# Patient Record
Sex: FEMALE | Race: WHITE | URBAN - METROPOLITAN AREA
[De-identification: names, ages, dates, MRNs, and addresses within clinical notes are randomized per-mention and may not be internally consistent; named-entity substitution may affect disease eponyms.]

---

## 2021-03-21 ENCOUNTER — IMMUNIZATION (OUTPATIENT)
Dept: PRIMARY CARE CLINIC | Age: 56
End: 2021-03-21
Payer: COMMERCIAL

## 2021-03-21 PROCEDURE — 91300 COVID-19, PFIZER VACCINE 30MCG/0.3ML DOSE: CPT | Performed by: PHYSICIAN ASSISTANT

## 2021-03-21 PROCEDURE — 0001A COVID-19, PFIZER VACCINE 30MCG/0.3ML DOSE: CPT | Performed by: PHYSICIAN ASSISTANT

## 2021-04-15 ENCOUNTER — IMMUNIZATION (OUTPATIENT)
Dept: PRIMARY CARE CLINIC | Age: 56
End: 2021-04-15
Payer: COMMERCIAL

## 2021-04-15 PROCEDURE — 91300 COVID-19, PFIZER VACCINE 30MCG/0.3ML DOSE: CPT | Performed by: INTERNAL MEDICINE

## 2021-04-15 PROCEDURE — 0002A COVID-19, PFIZER VACCINE 30MCG/0.3ML DOSE: CPT | Performed by: INTERNAL MEDICINE

## 2021-07-04 ENCOUNTER — NURSE TRIAGE (OUTPATIENT)
Dept: OTHER | Facility: CLINIC | Age: 56
End: 2021-07-04

## 2021-07-04 NOTE — TELEPHONE ENCOUNTER
Reason for Disposition   [1] SEVERE pain (e.g., excruciating, unable to do any normal activities) AND [2] not improved after 2 hours of pain medicine    Answer Assessment - Initial Assessment Questions  1. ONSET: \"When did the pain start?\"       1 day ago    2. LOCATION: \"Where is the pain located? \"       R leg pain    3. PAIN: \"How bad is the pain? \"    (Scale 1-10; or mild, moderate, severe)    -  MILD (1-3): doesn't interfere with normal activities     -  MODERATE (4-7): interferes with normal activities (e.g., work or school) or awakens from sleep, limping     -  SEVERE (8-10): excruciating pain, unable to do any normal activities, unable to walk      Severe    4. WORK OR EXERCISE: \"Has there been any recent work or exercise that involved this part of the body? \"       Denies    5. CAUSE: \"What do you think is causing the leg pain? \"      Unknown    6. OTHER SYMPTOMS: \"Do you have any other symptoms? \" (e.g., chest pain, back pain, breathing difficulty, swelling, rash, fever, numbness, weakness)      Denies    7. PREGNANCY: \"Is there any chance you are pregnant? \" \"When was your last menstrual period? \"      NA    Protocols used: LEG PAIN-ADULT-AH    Brief description of triage: R leg pain that began 1 day ago. Calf of right leg is swollen    Triage indicates for patient to be seen in 4 hours    Care advice provided, patient verbalizes understanding; denies any other questions or concerns; instructed to call back for any new or worsening symptoms. This triage is a result of a call to [facility]. Please do not respond to the triage nurse through this encounter. Any subsequent communication should be directly with the patient.

## 2023-03-06 DIAGNOSIS — R10.9 CHRONIC ABDOMINAL PAIN: Primary | ICD-10-CM

## 2023-03-06 DIAGNOSIS — G89.29 CHRONIC ABDOMINAL PAIN: Primary | ICD-10-CM

## 2023-03-07 ENCOUNTER — TELEPHONE (OUTPATIENT)
Dept: PRIMARY CARE | Facility: CLINIC | Age: 58
End: 2023-03-07
Payer: MEDICARE

## 2023-03-07 RX ORDER — POTASSIUM CHLORIDE 750 MG/1
1 TABLET, EXTENDED RELEASE ORAL DAILY
COMMUNITY
Start: 2020-01-07 | End: 2023-06-06 | Stop reason: SDUPTHER

## 2023-03-07 RX ORDER — OXYCODONE AND ACETAMINOPHEN 10; 325 MG/1; MG/1
1 TABLET ORAL EVERY 6 HOURS PRN
COMMUNITY
Start: 2021-04-16 | End: 2023-03-07 | Stop reason: SDUPTHER

## 2023-03-07 RX ORDER — ERGOCALCIFEROL 1.25 MG/1
1.25 CAPSULE ORAL 2 TIMES WEEKLY
COMMUNITY
Start: 2020-01-07 | End: 2023-06-06 | Stop reason: SDUPTHER

## 2023-03-07 RX ORDER — POLYETHYLENE GLYCOL 3350 17 G/17G
17 POWDER, FOR SOLUTION ORAL
COMMUNITY
Start: 2020-01-07

## 2023-03-07 RX ORDER — CYANOCOBALAMIN 1000 UG/ML
1000 INJECTION, SOLUTION INTRAMUSCULAR; SUBCUTANEOUS
COMMUNITY
Start: 2020-04-13 | End: 2023-09-08 | Stop reason: SDUPTHER

## 2023-03-07 RX ORDER — FUROSEMIDE 40 MG/1
1 TABLET ORAL 2 TIMES DAILY
COMMUNITY
Start: 2020-01-07 | End: 2023-06-06 | Stop reason: SDUPTHER

## 2023-03-07 RX ORDER — LANOLIN ALCOHOL/MO/W.PET/CERES
1 CREAM (GRAM) TOPICAL DAILY
COMMUNITY
Start: 2020-01-07

## 2023-03-07 RX ORDER — OXYCODONE AND ACETAMINOPHEN 10; 325 MG/1; MG/1
1 TABLET ORAL EVERY 6 HOURS PRN
Qty: 90 TABLET | Refills: 0 | Status: SHIPPED | OUTPATIENT
Start: 2023-03-07 | End: 2023-04-10 | Stop reason: SDUPTHER

## 2023-03-07 RX ORDER — NALOXONE HYDROCHLORIDE 4 MG/.1ML
4 SPRAY NASAL AS NEEDED
COMMUNITY
Start: 2020-01-07

## 2023-04-06 PROBLEM — R94.31 ABNORMAL EKG: Status: RESOLVED | Noted: 2023-04-06 | Resolved: 2023-04-06

## 2023-04-06 PROBLEM — F17.200 CURRENT SMOKER: Status: ACTIVE | Noted: 2023-04-06

## 2023-04-06 PROBLEM — M79.671 RIGHT FOOT PAIN: Status: RESOLVED | Noted: 2023-04-06 | Resolved: 2023-04-06

## 2023-04-06 PROBLEM — E04.1 THYROID NODULE: Status: ACTIVE | Noted: 2023-04-06

## 2023-04-06 PROBLEM — N95.9 POSTMENOPAUSAL SYMPTOMS: Status: ACTIVE | Noted: 2023-04-06

## 2023-04-06 PROBLEM — R07.89 ATYPICAL CHEST PAIN: Status: RESOLVED | Noted: 2023-04-06 | Resolved: 2023-04-06

## 2023-04-06 PROBLEM — I50.9 CONGESTIVE HEART FAILURE (MULTI): Status: ACTIVE | Noted: 2022-08-16

## 2023-04-06 PROBLEM — R10.9 CHRONIC ABDOMINAL PAIN: Status: ACTIVE | Noted: 2023-04-06

## 2023-04-06 PROBLEM — E53.8 VITAMIN B12 DEFICIENCY: Status: ACTIVE | Noted: 2023-04-06

## 2023-04-06 PROBLEM — G89.29 CHRONIC RIGHT SHOULDER PAIN: Status: ACTIVE | Noted: 2023-04-06

## 2023-04-06 PROBLEM — E55.9 VITAMIN D INSUFFICIENCY: Status: ACTIVE | Noted: 2023-04-06

## 2023-04-06 PROBLEM — E66.9 OBESITY, CLASS I, BMI 30-34.9: Status: ACTIVE | Noted: 2022-08-16

## 2023-04-06 PROBLEM — K90.829 SHORT GUT SYNDROME: Status: ACTIVE | Noted: 2023-04-06

## 2023-04-06 PROBLEM — M54.30 SCIATICA: Status: ACTIVE | Noted: 2023-04-06

## 2023-04-06 PROBLEM — M25.511 CHRONIC RIGHT SHOULDER PAIN: Status: ACTIVE | Noted: 2023-04-06

## 2023-04-06 PROBLEM — G89.29 CHRONIC ABDOMINAL PAIN: Status: ACTIVE | Noted: 2023-04-06

## 2023-04-06 PROBLEM — M79.604 PAIN OF RIGHT LOWER EXTREMITY: Status: RESOLVED | Noted: 2023-04-06 | Resolved: 2023-04-06

## 2023-04-06 PROBLEM — M25.551 RIGHT HIP PAIN: Status: ACTIVE | Noted: 2023-04-06

## 2023-04-06 PROBLEM — F11.20 CHRONIC NARCOTIC DEPENDENCE (MULTI): Status: ACTIVE | Noted: 2023-04-06

## 2023-04-06 PROBLEM — E78.2 HYPERLIPEMIA, MIXED: Status: ACTIVE | Noted: 2023-04-06

## 2023-04-06 PROBLEM — K46.9 ABDOMINAL HERNIA: Status: ACTIVE | Noted: 2023-04-06

## 2023-04-06 NOTE — ASSESSMENT & PLAN NOTE
Controlled med contract and drug screen done 1/23 - in old chart  I have personally reviewed the OARRS report.  This report is scanned into the electronic medical record.  I have considered the risks of abuse, dependence, addiction and diversion.  I believe that it is clinically appropriate to prescribe this medication.  Reevaluate in 3 months.

## 2023-04-06 NOTE — PROGRESS NOTES
Subjective   Patient ID: Angelia Jimenez is a 58 y.o. female who presents for follow up opiod visit.    HPI  Patient presents today follow up chronic pain meds. Pain is well controlled. No side effects from medication. Would like something for migraines.  Otherwise feels well. No other complaints or concerns.    The patient's relevant past medical, surgical, family and social history was reviewed in Epic.    Review of Systems  A complete review of systems was performed and all systems were normal except what is noted in the HPI.    Objective   Physical Exam  Constitutional:       General: She is not in acute distress.     Appearance: Normal appearance.   Neurological:      Mental Status: She is alert.   Psychiatric:         Mood and Affect: Mood normal.         Behavior: Behavior normal.         Thought Content: Thought content normal.         Judgment: Judgment normal.             Assessment/Plan   Problem List Items Addressed This Visit       Chronic abdominal pain - Primary     well controlled   Continue current medication = no side effects  Reevaluate in 3 months.           Chronic narcotic dependence (CMS/[facility])     Controlled med contract and drug screen done 1/23 - in old chart  I have personally reviewed the OARRS report.  This report is scanned into the electronic medical record.  I have considered the risks of abuse, dependence, addiction and diversion.  I believe that it is clinically appropriate to prescribe this medication.  Reevaluate in 3 months.               Patient understands and agrees with treatment plan.         Barbara Pulido MD

## 2023-04-06 NOTE — PATIENT INSTRUCTIONS
I would like you to follow up in 3 months  Please have all labs that were ordered done at least 1 week prior to your visit.

## 2023-04-10 ENCOUNTER — TELEMEDICINE (OUTPATIENT)
Dept: PRIMARY CARE | Facility: CLINIC | Age: 58
End: 2023-04-10
Payer: MEDICARE

## 2023-04-10 DIAGNOSIS — G89.29 CHRONIC ABDOMINAL PAIN: ICD-10-CM

## 2023-04-10 DIAGNOSIS — F11.20 CHRONIC NARCOTIC DEPENDENCE (MULTI): ICD-10-CM

## 2023-04-10 DIAGNOSIS — Z12.11 ENCOUNTER FOR SCREENING FOR MALIGNANT NEOPLASM OF COLON: ICD-10-CM

## 2023-04-10 DIAGNOSIS — G43.009 MIGRAINE WITHOUT AURA AND WITHOUT STATUS MIGRAINOSUS, NOT INTRACTABLE: ICD-10-CM

## 2023-04-10 DIAGNOSIS — R10.9 CHRONIC ABDOMINAL PAIN: ICD-10-CM

## 2023-04-10 DIAGNOSIS — R10.9 CHRONIC ABDOMINAL PAIN: Primary | ICD-10-CM

## 2023-04-10 DIAGNOSIS — G89.29 CHRONIC ABDOMINAL PAIN: Primary | ICD-10-CM

## 2023-04-10 PROCEDURE — 99213 OFFICE O/P EST LOW 20 MIN: CPT | Performed by: FAMILY MEDICINE

## 2023-04-10 RX ORDER — OXYCODONE AND ACETAMINOPHEN 10; 325 MG/1; MG/1
1 TABLET ORAL EVERY 6 HOURS PRN
Qty: 90 TABLET | Refills: 0 | Status: SHIPPED | OUTPATIENT
Start: 2023-04-10 | End: 2023-05-08 | Stop reason: SDUPTHER

## 2023-04-10 RX ORDER — SUMATRIPTAN SUCCINATE 100 MG/1
100 TABLET ORAL ONCE AS NEEDED
Qty: 27 TABLET | Refills: 3 | Status: SHIPPED | OUTPATIENT
Start: 2023-04-10 | End: 2023-04-26

## 2023-04-10 NOTE — ASSESSMENT & PLAN NOTE
Start imitrex as directed Risks, benefits and side effects reviewed with patient.  Reevaluate in 3 months.

## 2023-04-26 ENCOUNTER — TELEPHONE (OUTPATIENT)
Dept: PRIMARY CARE | Facility: CLINIC | Age: 58
End: 2023-04-26
Payer: MEDICARE

## 2023-04-26 DIAGNOSIS — G43.009 MIGRAINE WITHOUT AURA AND WITHOUT STATUS MIGRAINOSUS, NOT INTRACTABLE: Primary | ICD-10-CM

## 2023-04-26 NOTE — TELEPHONE ENCOUNTER
Patient calling to report that the Imitrex is not working for her . She ahs been taking it as soon as the migraine hits but it is not working. She has Nausea and vomiting. She was advised to put peppermint oil on her temples which did help , she is not longer vomiting, but the pain level remains as was. Is there anything else you can prescribe for her migraines,

## 2023-05-08 ENCOUNTER — TELEPHONE (OUTPATIENT)
Dept: PRIMARY CARE | Facility: CLINIC | Age: 58
End: 2023-05-08
Payer: MEDICARE

## 2023-05-08 DIAGNOSIS — G89.29 CHRONIC ABDOMINAL PAIN: ICD-10-CM

## 2023-05-08 DIAGNOSIS — R10.9 CHRONIC ABDOMINAL PAIN: ICD-10-CM

## 2023-05-08 RX ORDER — OXYCODONE AND ACETAMINOPHEN 10; 325 MG/1; MG/1
1 TABLET ORAL EVERY 6 HOURS PRN
Qty: 90 TABLET | Refills: 0 | Status: SHIPPED | OUTPATIENT
Start: 2023-05-10 | End: 2023-06-06 | Stop reason: SDUPTHER

## 2023-05-08 NOTE — TELEPHONE ENCOUNTER
Rx Refill Request Telephone Encounter    Name:  Angelia Jimenez  :  [id number]  Medication Name:  Oxycodone  Dose :  mg  Route : oral  Frequency : every 6 hours  90  1 tablet every 6 hours  Specific Pharmacy location:  [facility] [place]  Date of last appointment:  April 10  Date of next appointment:    Best number to reach patient:  [phone]

## 2023-06-04 ASSESSMENT — ENCOUNTER SYMPTOMS
SHORTNESS OF BREATH: 0
HYPERTENSION: 1
ORTHOPNEA: 0
NECK PAIN: 1
PALPITATIONS: 0
PND: 0
BLURRED VISION: 0
HEADACHES: 1
SWEATS: 0

## 2023-06-06 ENCOUNTER — OFFICE VISIT (OUTPATIENT)
Dept: PRIMARY CARE | Facility: CLINIC | Age: 58
End: 2023-06-06
Payer: MEDICARE

## 2023-06-06 VITALS
SYSTOLIC BLOOD PRESSURE: 131 MMHG | WEIGHT: 164.6 LBS | HEIGHT: 63 IN | TEMPERATURE: 98 F | DIASTOLIC BLOOD PRESSURE: 84 MMHG | HEART RATE: 84 BPM | BODY MASS INDEX: 29.16 KG/M2 | OXYGEN SATURATION: 97 %

## 2023-06-06 DIAGNOSIS — R09.89 OTHER SPECIFIED SYMPTOMS AND SIGNS INVOLVING THE CIRCULATORY AND RESPIRATORY SYSTEMS: ICD-10-CM

## 2023-06-06 DIAGNOSIS — I83.92 VARICOSE VEINS OF LEFT LOWER EXTREMITY, UNSPECIFIED WHETHER COMPLICATED: Primary | ICD-10-CM

## 2023-06-06 DIAGNOSIS — M79.89 LEFT LEG SWELLING: ICD-10-CM

## 2023-06-06 DIAGNOSIS — Z00.00 HEALTH MAINTENANCE EXAMINATION: ICD-10-CM

## 2023-06-06 DIAGNOSIS — R10.9 CHRONIC ABDOMINAL PAIN: ICD-10-CM

## 2023-06-06 DIAGNOSIS — M79.605 PAIN OF LEFT LOWER EXTREMITY: ICD-10-CM

## 2023-06-06 DIAGNOSIS — F17.200 CURRENT SMOKER: ICD-10-CM

## 2023-06-06 DIAGNOSIS — G89.29 CHRONIC ABDOMINAL PAIN: ICD-10-CM

## 2023-06-06 DIAGNOSIS — E55.9 VITAMIN D INSUFFICIENCY: ICD-10-CM

## 2023-06-06 PROCEDURE — 99214 OFFICE O/P EST MOD 30 MIN: CPT | Performed by: NURSE PRACTITIONER

## 2023-06-06 RX ORDER — OXYCODONE AND ACETAMINOPHEN 10; 325 MG/1; MG/1
1 TABLET ORAL EVERY 6 HOURS PRN
Qty: 90 TABLET | Refills: 0 | Status: SHIPPED | OUTPATIENT
Start: 2023-06-06 | End: 2023-07-05 | Stop reason: SDUPTHER

## 2023-06-06 RX ORDER — FUROSEMIDE 40 MG/1
40 TABLET ORAL 2 TIMES DAILY
Qty: 180 TABLET | Refills: 1 | Status: SHIPPED | OUTPATIENT
Start: 2023-06-06 | End: 2023-12-27 | Stop reason: SDUPTHER

## 2023-06-06 RX ORDER — POTASSIUM CHLORIDE 750 MG/1
10 TABLET, FILM COATED, EXTENDED RELEASE ORAL DAILY
Qty: 90 TABLET | Refills: 1 | Status: SHIPPED | OUTPATIENT
Start: 2023-06-06 | End: 2024-02-01 | Stop reason: SDUPTHER

## 2023-06-06 RX ORDER — ERGOCALCIFEROL 1.25 MG/1
1.25 CAPSULE ORAL
Qty: 12 CAPSULE | Refills: 0 | Status: SHIPPED | OUTPATIENT
Start: 2023-06-06 | End: 2023-09-08

## 2023-06-06 ASSESSMENT — ENCOUNTER SYMPTOMS
COUGH: 0
WHEEZING: 0
ORTHOPNEA: 0
ARTHRALGIAS: 1
PND: 0
HEADACHES: 0
CHILLS: 0
SWEATS: 0
FEVER: 0
PALPITATIONS: 0
BLURRED VISION: 0
NECK PAIN: 0
ACTIVITY CHANGE: 0
FATIGUE: 0
WEAKNESS: 0
HYPERTENSION: 1
DIZZINESS: 0
SHORTNESS OF BREATH: 0

## 2023-06-06 ASSESSMENT — PATIENT HEALTH QUESTIONNAIRE - PHQ9
SUM OF ALL RESPONSES TO PHQ9 QUESTIONS 1 AND 2: 0
1. LITTLE INTEREST OR PLEASURE IN DOING THINGS: NOT AT ALL
2. FEELING DOWN, DEPRESSED OR HOPELESS: NOT AT ALL

## 2023-06-06 NOTE — PROGRESS NOTES
"Subjective   Patient ID: Angelia Jimenez is a 58 y.o. female who presents for Follow-up (ER pain/ swelling in legs and feet).    Left lower extremity pain and swelling  Present for about 1 month   Recent ER visit: Doppler unremarkable   Patient reports she was told to follow-up with PCP and have vascular work-up.  Patient with continual left lower extremity pain and swelling on today.  No redness or warmth  No fever.  Rates pain less than 8 on a scale of 0-10  Does have varicose veins    Hypertension  The current episode started in the past 7 days. The problem is unchanged. The problem is resistant. Associated symptoms include malaise/fatigue and peripheral edema. Pertinent negatives include no anxiety, blurred vision, chest pain, headaches, neck pain, orthopnea, palpitations, PND, shortness of breath or sweats. Risk factors for coronary artery disease include family history, post-menopausal state, smoking/tobacco exposure and stress. There are no compliance problems.         Review of Systems   Constitutional:  Positive for malaise/fatigue. Negative for activity change, chills, fatigue and fever.   Eyes:  Negative for blurred vision.   Respiratory:  Negative for cough, shortness of breath and wheezing.    Cardiovascular:  Positive for leg swelling. Negative for chest pain, palpitations, orthopnea and PND.   Musculoskeletal:  Positive for arthralgias. Negative for neck pain.   Neurological:  Negative for dizziness, weakness and headaches.       Objective   /84   Pulse 84   Temp 36.7 °C (98 °F)   Ht 1.6 m (5' 3\")   Wt 74.7 kg (164 lb 9.6 oz)   SpO2 97%   BMI 29.16 kg/m²     Physical Exam  Vitals reviewed.   Constitutional:       Appearance: Normal appearance.   HENT:      Head: Normocephalic.   Eyes:      Conjunctiva/sclera: Conjunctivae normal.   Cardiovascular:      Rate and Rhythm: Normal rate and regular rhythm.      Pulses: Normal pulses.      Heart sounds: Normal heart sounds.   Pulmonary:      Effort: " Pulmonary effort is normal.      Breath sounds: Normal breath sounds. No wheezing.   Abdominal:      General: Bowel sounds are normal. There is no distension.   Skin:     General: Skin is warm and dry.   Neurological:      General: No focal deficit present.      Mental Status: She is alert and oriented to person, place, and time.   Psychiatric:         Mood and Affect: Mood normal.         Behavior: Behavior normal.         Assessment/Plan   Problem List Items Addressed This Visit          Nervous    Chronic abdominal pain    Relevant Medications    oxyCODONE-acetaminophen (Percocet)  mg tablet       Endocrine/Metabolic    Vitamin D insufficiency    Relevant Medications    ergocalciferol (Vitamin D-2) 1.25 MG (71395 UT) capsule       Other    Current smoker    Relevant Orders    Vascular US PVR with exercise     Other Visit Diagnoses       Varicose veins of left lower extremity, unspecified whether complicated    -  Primary    Relevant Orders    Referral to Vascular Medicine    Health maintenance examination        Relevant Medications    furosemide (Lasix) 40 mg tablet    potassium chloride CR 10 mEq ER tablet    Left leg swelling        Relevant Orders    Vascular US PVR with exercise    Referral to Vascular Medicine    Pain of left lower extremity        Relevant Orders    Vascular US PVR with exercise    Referral to Vascular Medicine    Other specified symptoms and signs involving the circulatory and respiratory systems        Relevant Orders    Vascular US PVR with exercise

## 2023-06-12 DIAGNOSIS — I87.2 VENOUS INSUFFICIENCY: Primary | ICD-10-CM

## 2023-06-12 DIAGNOSIS — M79.662 PAIN IN LEFT LOWER LEG: ICD-10-CM

## 2023-06-29 DIAGNOSIS — F17.210 CIGARETTE NICOTINE DEPENDENCE WITHOUT COMPLICATION: Primary | ICD-10-CM

## 2023-07-05 ENCOUNTER — TELEPHONE (OUTPATIENT)
Dept: PRIMARY CARE | Facility: CLINIC | Age: 58
End: 2023-07-05
Payer: MEDICARE

## 2023-07-05 DIAGNOSIS — R10.9 CHRONIC ABDOMINAL PAIN: Primary | ICD-10-CM

## 2023-07-05 DIAGNOSIS — G89.29 CHRONIC ABDOMINAL PAIN: Primary | ICD-10-CM

## 2023-07-05 RX ORDER — OXYCODONE AND ACETAMINOPHEN 10; 325 MG/1; MG/1
1 TABLET ORAL EVERY 6 HOURS PRN
Qty: 90 TABLET | Refills: 0 | Status: SHIPPED | OUTPATIENT
Start: 2023-07-05 | End: 2023-08-02 | Stop reason: SDUPTHER

## 2023-07-05 NOTE — TELEPHONE ENCOUNTER
Rx Refill Request Telephone Encounter    Name:  Angelia Jimenez  :  [id number]  Medication Name:        Oxycodone-Acetaminophen  MG Oral Tablet 1 tab taken every 6 hours                    Specific Pharmacy location:  [facility]    Date of last appointment:  2023  Date of next appointment:  2023  Best number to reach patient:  [phone]

## 2023-07-21 LAB — NONINV COLON CA DNA+OCC BLD SCRN STL QL: NORMAL

## 2023-08-02 ENCOUNTER — TELEPHONE (OUTPATIENT)
Dept: PRIMARY CARE | Facility: CLINIC | Age: 58
End: 2023-08-02
Payer: MEDICARE

## 2023-08-02 DIAGNOSIS — R10.9 CHRONIC ABDOMINAL PAIN: Primary | ICD-10-CM

## 2023-08-02 DIAGNOSIS — G89.29 CHRONIC ABDOMINAL PAIN: Primary | ICD-10-CM

## 2023-08-02 NOTE — TELEPHONE ENCOUNTER
Rx Refill Request Telephone Encounter    Name:  Angelia Jimenez  :  [id number]  Medication Name:      oxyCODONE-acetaminophen (Percocet)  mg tablet [[id number]]     1 tab taken every 6 hours as needed                  Specific Pharmacy location:   [facility]  Date of last appointment:  2023  Date of next appointment:  2023  Best number to reach patient:  [phone]

## 2023-08-03 RX ORDER — OXYCODONE AND ACETAMINOPHEN 10; 325 MG/1; MG/1
1 TABLET ORAL EVERY 6 HOURS PRN
Qty: 90 TABLET | Refills: 0 | Status: SHIPPED | OUTPATIENT
Start: 2023-08-03 | End: 2023-08-31 | Stop reason: SDUPTHER

## 2023-08-27 DIAGNOSIS — Z12.11 ENCOUNTER FOR SCREENING FOR MALIGNANT NEOPLASM OF COLON: Primary | ICD-10-CM

## 2023-08-27 DIAGNOSIS — K90.829 SHORT GUT SYNDROME: ICD-10-CM

## 2023-08-29 PROBLEM — F17.210 NICOTINE DEPENDENCE, CIGARETTES, UNCOMPLICATED: Status: ACTIVE | Noted: 2023-08-29

## 2023-08-29 PROBLEM — G44.209 TENSION HEADACHE: Status: ACTIVE | Noted: 2023-08-29

## 2023-08-29 RX ORDER — SUMATRIPTAN SUCCINATE 100 MG/1
TABLET ORAL
COMMUNITY
Start: 2023-04-10

## 2023-08-31 ENCOUNTER — TELEPHONE (OUTPATIENT)
Dept: PRIMARY CARE | Facility: CLINIC | Age: 58
End: 2023-08-31
Payer: MEDICARE

## 2023-08-31 DIAGNOSIS — R10.9 CHRONIC ABDOMINAL PAIN: ICD-10-CM

## 2023-08-31 DIAGNOSIS — G89.29 CHRONIC ABDOMINAL PAIN: ICD-10-CM

## 2023-08-31 RX ORDER — OXYCODONE AND ACETAMINOPHEN 10; 325 MG/1; MG/1
1 TABLET ORAL EVERY 6 HOURS PRN
Qty: 90 TABLET | Refills: 0 | Status: SHIPPED | OUTPATIENT
Start: 2023-08-31 | End: 2023-09-27 | Stop reason: SDUPTHER

## 2023-08-31 NOTE — TELEPHONE ENCOUNTER
Rx Refill Request Telephone Encounter    Name:  Angelia Jimenez  :  [id number]  Medication Name:      oxyCODONE-acetaminophen (Percocet)  mg tablet           Specific Pharmacy location:   [id number] [address]         Date of last appointment:  23  Date of next appointment: 23   Best number to reach patient: [phone]

## 2023-09-06 LAB
ALANINE AMINOTRANSFERASE (SGPT) (U/L) IN SER/PLAS: 15 U/L (ref 7–45)
ALBUMIN (G/DL) IN SER/PLAS: 4.6 G/DL (ref 3.4–5)
ALKALINE PHOSPHATASE (U/L) IN SER/PLAS: 65 U/L (ref 33–136)
ANION GAP IN SER/PLAS: 17 MMOL/L (ref 10–20)
ASPARTATE AMINOTRANSFERASE (SGOT) (U/L) IN SER/PLAS: 18 U/L (ref 9–39)
BILIRUBIN TOTAL (MG/DL) IN SER/PLAS: 0.5 MG/DL (ref 0–1.2)
CALCIDIOL (25 OH VITAMIN D3) (NG/ML) IN SER/PLAS: 54 NG/ML
CALCIUM (MG/DL) IN SER/PLAS: 9.9 MG/DL (ref 8.6–10.6)
CARBON DIOXIDE, TOTAL (MMOL/L) IN SER/PLAS: 23 MMOL/L (ref 21–32)
CHLORIDE (MMOL/L) IN SER/PLAS: 106 MMOL/L (ref 98–107)
CHOLESTEROL (MG/DL) IN SER/PLAS: 194 MG/DL (ref 0–199)
CHOLESTEROL IN HDL (MG/DL) IN SER/PLAS: 71.7 MG/DL
CHOLESTEROL IN LDL (MG/DL) IN SER/PLAS BY DIRECT ASSAY: 90 MG/DL (ref 0–129)
CHOLESTEROL/HDL RATIO: 2.7
COBALAMIN (VITAMIN B12) (PG/ML) IN SER/PLAS: 129 PG/ML (ref 211–911)
CREATININE (MG/DL) IN SER/PLAS: 0.88 MG/DL (ref 0.5–1.05)
ERYTHROCYTE DISTRIBUTION WIDTH (RATIO) BY AUTOMATED COUNT: 13.7 % (ref 11.5–14.5)
ERYTHROCYTE MEAN CORPUSCULAR HEMOGLOBIN CONCENTRATION (G/DL) BY AUTOMATED: 33 G/DL (ref 32–36)
ERYTHROCYTE MEAN CORPUSCULAR VOLUME (FL) BY AUTOMATED COUNT: 94 FL (ref 80–100)
ERYTHROCYTES (10*6/UL) IN BLOOD BY AUTOMATED COUNT: 4.61 X10E12/L (ref 4–5.2)
FERRITIN (UG/LL) IN SER/PLAS: 302 UG/L (ref 8–150)
FOLATE (NG/ML) IN SER/PLAS: 14.8 NG/ML
GFR FEMALE: 75 ML/MIN/1.73M2
GLUCOSE (MG/DL) IN SER/PLAS: 84 MG/DL (ref 74–99)
HEMATOCRIT (%) IN BLOOD BY AUTOMATED COUNT: 43.3 % (ref 36–46)
HEMOGLOBIN (G/DL) IN BLOOD: 14.3 G/DL (ref 12–16)
LDL: 82 MG/DL (ref 0–99)
LEUKOCYTES (10*3/UL) IN BLOOD BY AUTOMATED COUNT: 6.4 X10E9/L (ref 4.4–11.3)
MAGNESIUM (MG/DL) IN SER/PLAS: 2.39 MG/DL (ref 1.6–2.4)
NON HDL CHOLESTEROL: 122 MG/DL
NRBC (PER 100 WBCS) BY AUTOMATED COUNT: 0 /100 WBC (ref 0–0)
PLATELETS (10*3/UL) IN BLOOD AUTOMATED COUNT: 204 X10E9/L (ref 150–450)
POTASSIUM (MMOL/L) IN SER/PLAS: 5 MMOL/L (ref 3.5–5.3)
PROTEIN TOTAL: 6.8 G/DL (ref 6.4–8.2)
SODIUM (MMOL/L) IN SER/PLAS: 141 MMOL/L (ref 136–145)
THYROTROPIN (MIU/L) IN SER/PLAS BY DETECTION LIMIT <= 0.05 MIU/L: 1.5 MIU/L (ref 0.44–3.98)
TRIGLYCERIDE (MG/DL) IN SER/PLAS: 201 MG/DL (ref 0–149)
UREA NITROGEN (MG/DL) IN SER/PLAS: 16 MG/DL (ref 6–23)
VLDL: 40 MG/DL (ref 0–40)

## 2023-09-07 PROBLEM — F11.90 CHRONIC NARCOTIC USE: Status: ACTIVE | Noted: 2023-04-06

## 2023-09-07 NOTE — ASSESSMENT & PLAN NOTE
Low  B12 injection today and will restart monthly injections at home  Also continue OTC b12 as directed   Recheck 6 months

## 2023-09-07 NOTE — ASSESSMENT & PLAN NOTE
well controlled   Continue current medication = no side effects  CMC and urine drug done 1/23  Reevaluate in 3 months.

## 2023-09-07 NOTE — ASSESSMENT & PLAN NOTE
Encourage smoking cessation - not ready to quit - down to 2-3/day  Has CT lung order - will schedule

## 2023-09-07 NOTE — PATIENT INSTRUCTIONS
Please follow up in 3 months to review your chronic pain med use.     would like you to follow up in 6 months  Please have all labs that were ordered done at least 1 week prior to your visit.     Recommend healthy diet based around fruits, vegetables, and lean proteins such as chicken, turkey, fish, and beans.  Also include moderate portions of healthy carbohydrates such as wheat bread and pasta, sweet potatoes. Limit sweets and alcoholic beverages. Try not drink more than 100 calories in beverages daily.   It is important to get a protein-rich breakfast daily such as oatmeal, eggs or Greek yogurt.  Increase activity as able to a recommended goal of at least 30 minutes of cardiovascular exercise (walking, swimming, biking, jogging etc.) at least 5 days weekly and a goal of 45 minutes or more most days of the week for weight loss. This exercise can be done all at one time or broken up into 2 or more sessions throughout the day.

## 2023-09-08 ENCOUNTER — OFFICE VISIT (OUTPATIENT)
Dept: PRIMARY CARE | Facility: CLINIC | Age: 58
End: 2023-09-08
Payer: MEDICARE

## 2023-09-08 VITALS
HEART RATE: 90 BPM | TEMPERATURE: 97.1 F | OXYGEN SATURATION: 97 % | DIASTOLIC BLOOD PRESSURE: 85 MMHG | HEIGHT: 63 IN | SYSTOLIC BLOOD PRESSURE: 130 MMHG | BODY MASS INDEX: 30.37 KG/M2 | WEIGHT: 171.4 LBS

## 2023-09-08 DIAGNOSIS — E55.9 VITAMIN D INSUFFICIENCY: ICD-10-CM

## 2023-09-08 DIAGNOSIS — E66.9 OBESITY, CLASS I, BMI 30-34.9: ICD-10-CM

## 2023-09-08 DIAGNOSIS — Z11.59 ENCOUNTER FOR HEPATITIS C SCREENING TEST FOR LOW RISK PATIENT: ICD-10-CM

## 2023-09-08 DIAGNOSIS — E53.8 VITAMIN B12 DEFICIENCY: Primary | ICD-10-CM

## 2023-09-08 DIAGNOSIS — F17.200 CURRENT SMOKER: ICD-10-CM

## 2023-09-08 DIAGNOSIS — E78.2 HYPERLIPEMIA, MIXED: ICD-10-CM

## 2023-09-08 DIAGNOSIS — M48.062 PSEUDOCLAUDICATION: ICD-10-CM

## 2023-09-08 DIAGNOSIS — G89.29 CHRONIC ABDOMINAL PAIN: ICD-10-CM

## 2023-09-08 DIAGNOSIS — R10.9 CHRONIC ABDOMINAL PAIN: ICD-10-CM

## 2023-09-08 PROCEDURE — 90677 PCV20 VACCINE IM: CPT | Performed by: FAMILY MEDICINE

## 2023-09-08 PROCEDURE — 96372 THER/PROPH/DIAG INJ SC/IM: CPT | Performed by: FAMILY MEDICINE

## 2023-09-08 PROCEDURE — 99214 OFFICE O/P EST MOD 30 MIN: CPT | Performed by: FAMILY MEDICINE

## 2023-09-08 PROCEDURE — G0009 ADMIN PNEUMOCOCCAL VACCINE: HCPCS | Performed by: FAMILY MEDICINE

## 2023-09-08 RX ORDER — SYRINGE W-NEEDLE,DISPOSAB,3 ML 25GX5/8"
1 SYRINGE, EMPTY DISPOSABLE MISCELLANEOUS
Qty: 100 EACH | Refills: 0 | Status: SHIPPED | OUTPATIENT
Start: 2023-09-08

## 2023-09-08 RX ORDER — CYANOCOBALAMIN 1000 UG/ML
1000 INJECTION, SOLUTION INTRAMUSCULAR; SUBCUTANEOUS ONCE
Status: COMPLETED | OUTPATIENT
Start: 2023-09-08 | End: 2023-09-08

## 2023-09-08 RX ORDER — CYANOCOBALAMIN 1000 UG/ML
1000 INJECTION, SOLUTION INTRAMUSCULAR; SUBCUTANEOUS
Qty: 1 ML | Refills: 11 | Status: SHIPPED | OUTPATIENT
Start: 2023-09-08 | End: 2024-05-29 | Stop reason: SDUPTHER

## 2023-09-08 RX ORDER — SYRINGE W-NEEDLE,DISPOSAB,3 ML 25GX5/8"
1 SYRINGE, EMPTY DISPOSABLE MISCELLANEOUS
COMMUNITY
End: 2023-09-08 | Stop reason: SDUPTHER

## 2023-09-08 RX ADMIN — CYANOCOBALAMIN 1000 MCG: 1000 INJECTION, SOLUTION INTRAMUSCULAR; SUBCUTANEOUS at 10:09

## 2023-09-08 ASSESSMENT — PATIENT HEALTH QUESTIONNAIRE - PHQ9
1. LITTLE INTEREST OR PLEASURE IN DOING THINGS: NOT AT ALL
SUM OF ALL RESPONSES TO PHQ9 QUESTIONS 1 AND 2: 0
2. FEELING DOWN, DEPRESSED OR HOPELESS: NOT AT ALL

## 2023-09-27 ENCOUNTER — TELEPHONE (OUTPATIENT)
Dept: PRIMARY CARE | Facility: CLINIC | Age: 58
End: 2023-09-27
Payer: MEDICARE

## 2023-09-27 DIAGNOSIS — G89.29 CHRONIC ABDOMINAL PAIN: ICD-10-CM

## 2023-09-27 DIAGNOSIS — R10.9 CHRONIC ABDOMINAL PAIN: ICD-10-CM

## 2023-09-27 NOTE — TELEPHONE ENCOUNTER
Rx Refill Request Telephone Encounter    Name:  Angelia Jimenez  :  [id number]  Medication Name:        oxyCODONE-acetaminophen (Percocet)  mg tablet [[id number]]  1 tab by mouth every 6 hours if needed                    Specific Pharmacy location:  [facility]  Date of last appointment:  2023  Date of next appointment:  2023  Best number to reach patient:  [phone]

## 2023-09-28 RX ORDER — OXYCODONE AND ACETAMINOPHEN 10; 325 MG/1; MG/1
1 TABLET ORAL EVERY 6 HOURS PRN
Qty: 90 TABLET | Refills: 0 | Status: SHIPPED | OUTPATIENT
Start: 2023-09-28 | End: 2023-10-30 | Stop reason: SDUPTHER

## 2023-10-12 ENCOUNTER — APPOINTMENT (OUTPATIENT)
Dept: GASTROENTEROLOGY | Facility: CLINIC | Age: 58
End: 2023-10-12
Payer: MEDICARE

## 2023-10-30 ENCOUNTER — TELEPHONE (OUTPATIENT)
Dept: PRIMARY CARE | Facility: CLINIC | Age: 58
End: 2023-10-30
Payer: MEDICARE

## 2023-10-30 DIAGNOSIS — G89.29 CHRONIC ABDOMINAL PAIN: ICD-10-CM

## 2023-10-30 DIAGNOSIS — R10.9 CHRONIC ABDOMINAL PAIN: ICD-10-CM

## 2023-10-30 RX ORDER — OXYCODONE AND ACETAMINOPHEN 10; 325 MG/1; MG/1
1 TABLET ORAL EVERY 6 HOURS PRN
Qty: 90 TABLET | Refills: 0 | Status: SHIPPED | OUTPATIENT
Start: 2023-10-30 | End: 2023-11-27 | Stop reason: SDUPTHER

## 2023-10-30 NOTE — TELEPHONE ENCOUNTER
Rx Refill Request Telephone Encounter    Name:  Angelia Jimenez  :  [id number]  Medication Name:      oxyCODONE-acetaminophen (Percocet)  mg tablet [[id number] 1 tab taken every 6 hours if needed for pain                  Specific Pharmacy location:  [facility]  Date of last appointment:  2023  Date of next appointment:  2023  Best number to reach patient:  [phone]

## 2023-11-16 ENCOUNTER — APPOINTMENT (OUTPATIENT)
Dept: GASTROENTEROLOGY | Facility: CLINIC | Age: 58
End: 2023-11-16
Payer: MEDICARE

## 2023-11-20 ENCOUNTER — APPOINTMENT (OUTPATIENT)
Dept: GASTROENTEROLOGY | Facility: CLINIC | Age: 58
End: 2023-11-20
Payer: MEDICARE

## 2023-11-27 ENCOUNTER — TELEPHONE (OUTPATIENT)
Dept: PRIMARY CARE | Facility: CLINIC | Age: 58
End: 2023-11-27
Payer: MEDICARE

## 2023-11-27 DIAGNOSIS — R10.9 CHRONIC ABDOMINAL PAIN: ICD-10-CM

## 2023-11-27 DIAGNOSIS — G89.29 CHRONIC ABDOMINAL PAIN: ICD-10-CM

## 2023-11-27 NOTE — TELEPHONE ENCOUNTER
Rx Refill Request Telephone Encounter    Name:  Angelia Jimenez  :  [id number]  Medication Name:  Percocet   mg  Route : oral  Take 1 tablet by mouth every 6 hours if needed for severe pain (7 - 10).       [facility]  Date of last appointment:    Date of next appointment:  Dec 11  Best number to reach patient:  [phone]      Patient aware physician is out of office

## 2023-11-28 RX ORDER — OXYCODONE AND ACETAMINOPHEN 10; 325 MG/1; MG/1
1 TABLET ORAL EVERY 6 HOURS PRN
Qty: 90 TABLET | Refills: 0 | Status: SHIPPED | OUTPATIENT
Start: 2023-11-28 | End: 2023-12-11 | Stop reason: SDUPTHER

## 2023-12-08 ENCOUNTER — DOCUMENTATION (OUTPATIENT)
Dept: PRIMARY CARE | Facility: CLINIC | Age: 58
End: 2023-12-08
Payer: MEDICARE

## 2023-12-11 ENCOUNTER — TELEMEDICINE (OUTPATIENT)
Dept: PRIMARY CARE | Facility: CLINIC | Age: 58
End: 2023-12-11
Payer: MEDICARE

## 2023-12-11 ENCOUNTER — DOCUMENTATION (OUTPATIENT)
Dept: PRIMARY CARE | Facility: CLINIC | Age: 58
End: 2023-12-11

## 2023-12-11 ENCOUNTER — PATIENT OUTREACH (OUTPATIENT)
Dept: CARE COORDINATION | Facility: CLINIC | Age: 58
End: 2023-12-11

## 2023-12-11 DIAGNOSIS — G89.29 CHRONIC ABDOMINAL PAIN: ICD-10-CM

## 2023-12-11 DIAGNOSIS — R10.9 CHRONIC ABDOMINAL PAIN: ICD-10-CM

## 2023-12-11 DIAGNOSIS — S42.201A CLOSED FRACTURE OF PROXIMAL END OF RIGHT HUMERUS, UNSPECIFIED FRACTURE MORPHOLOGY, INITIAL ENCOUNTER: Primary | ICD-10-CM

## 2023-12-11 PROCEDURE — 99495 TRANSJ CARE MGMT MOD F2F 14D: CPT | Performed by: FAMILY MEDICINE

## 2023-12-11 RX ORDER — OXYCODONE AND ACETAMINOPHEN 10; 325 MG/1; MG/1
1 TABLET ORAL EVERY 6 HOURS PRN
Qty: 90 TABLET | Refills: 0 | Status: SHIPPED | OUTPATIENT
Start: 2023-12-11 | End: 2024-01-10

## 2023-12-11 NOTE — PROGRESS NOTES
Subjective   Patient ID: Angelia Jimenez is a 58 y.o. female who presents for Hospital Follow-up.      HPI  Patient presents today for hospital follow up. Admitted [facility] last week after being pulled over by her dog resulting in dislocation and fracture of her right shoulder. Had surgical treatment. Pain controlled but is taking 4 percocet daily as opposed to 2-3. No side effects pain meds. Doing ok at home. Wating to hear from ortho for follow up appointment.    Otherwise feels well. No other complaints or concerns.    The patient's relevant past medical, surgical, family and social history was reviewed in Epic.    Review of Systems  A complete review of systems was performed and all systems were normal except what is noted in the HPI.    Objective   Physical Exam  Constitutional:       Appearance: Normal appearance.   Neurological:      Mental Status: She is alert.             Assessment/Plan   Problem List Items Addressed This Visit       Chronic abdominal pain    Relevant Medications    oxyCODONE-acetaminophen (Percocet)  mg tablet     Other Visit Diagnoses       Closed fracture of proximal end of right humerus, unspecified fracture morphology, initial encounter    -  Primary    Relevant Medications    oxyCODONE-acetaminophen (Percocet)  mg tablet             Patient understands and agrees with treatment plan.         Barbara Pulido MD

## 2023-12-11 NOTE — PROGRESS NOTES
Discharge Facility:[facility]  Discharge Diagnosis:Fall S/P open reduction of right proximal humerus   Admission Date:12/6/233  Discharge Date: 12/08/23    PCP Appointment Date:12/11/23  Specialist Appointment Date:   Hospital Encounter and Summary: Linked  Patient has a 2 day follow up appointment   I did reach out to patient no call back as of this note.

## 2023-12-11 NOTE — ASSESSMENT & PLAN NOTE
Hospital course reviewed with patient  Ok for 4 percocet daily for now - wean as able - Risks, benefits and side effects reviewed with patient.   Ok early refill pain meds x 1  I have personally reviewed the OARRS report.  This report is scanned into the electronic medical record.  I have considered the risks of abuse, dependence, addiction and diversion.  I believe that it is clinically appropriate to prescribe this medication.  Patient will schedule follow up ortho ASAP

## 2023-12-21 ENCOUNTER — PATIENT OUTREACH (OUTPATIENT)
Dept: CARE COORDINATION | Facility: CLINIC | Age: 58
End: 2023-12-21
Payer: MEDICARE

## 2023-12-21 NOTE — PROGRESS NOTES
Unable to reach patient for call back after patient's follow up appointment with PCP.   WILLAM with call back number for patient to call if needed   If no voicemail available call attempts x 2 were made to contact the patient to assist with any questions or concerns patient may have.

## 2023-12-27 ENCOUNTER — TELEPHONE (OUTPATIENT)
Dept: PRIMARY CARE | Facility: CLINIC | Age: 58
End: 2023-12-27
Payer: MEDICARE

## 2023-12-27 DIAGNOSIS — Z00.00 HEALTH MAINTENANCE EXAMINATION: ICD-10-CM

## 2023-12-27 RX ORDER — FUROSEMIDE 40 MG/1
40 TABLET ORAL 2 TIMES DAILY
Qty: 180 TABLET | Refills: 1 | Status: SHIPPED | OUTPATIENT
Start: 2023-12-27 | End: 2024-03-08 | Stop reason: SDUPTHER

## 2023-12-27 NOTE — TELEPHONE ENCOUNTER
Rx Refill Request Telephone Encounter    Name:  Angelia Jimenez  :  [id number]  Medication Name:      furosemide (Lasix) 40 mg tablet    Route: Take 1 tablet (40 mg) by mouth 2 times a day.       Specific Pharmacy location:  [id number] [address] [facility]   Date of last appointment:  23  Date of next appointment:  3/8/24  Best number to reach patient: [phone]

## 2024-01-08 ENCOUNTER — PATIENT OUTREACH (OUTPATIENT)
Dept: CARE COORDINATION | Facility: CLINIC | Age: 59
End: 2024-01-08
Payer: MEDICARE

## 2024-01-26 ENCOUNTER — TELEPHONE (OUTPATIENT)
Dept: PRIMARY CARE | Facility: CLINIC | Age: 59
End: 2024-01-26
Payer: MEDICARE

## 2024-01-26 DIAGNOSIS — G89.29 CHRONIC RIGHT SHOULDER PAIN: ICD-10-CM

## 2024-01-26 DIAGNOSIS — M25.511 CHRONIC RIGHT SHOULDER PAIN: ICD-10-CM

## 2024-01-26 RX ORDER — OXYCODONE AND ACETAMINOPHEN 10; 325 MG/1; MG/1
1 TABLET ORAL EVERY 6 HOURS PRN
Qty: 90 TABLET | Refills: 0 | Status: SHIPPED | OUTPATIENT
Start: 2024-01-26 | End: 2024-02-26 | Stop reason: SDUPTHER

## 2024-01-26 NOTE — TELEPHONE ENCOUNTER
Rx Refill Request Telephone Encounter    Name:  Angelia Jimenez  :  [id number]  Medication Name:      oxyCODONE-acetaminophen (Percocet)  mg tablet [[id number]  1 tab taken by mouth every 6 hours if needed for severe pain                    Specific Pharmacy location:  [facility] [place]  Date of last appointment:  2023  Date of next appointment:  2024  Best number to reach patient:  [phone]

## 2024-02-01 ENCOUNTER — TELEPHONE (OUTPATIENT)
Dept: PRIMARY CARE | Facility: CLINIC | Age: 59
End: 2024-02-01
Payer: MEDICARE

## 2024-02-01 DIAGNOSIS — Z00.00 HEALTH MAINTENANCE EXAMINATION: ICD-10-CM

## 2024-02-01 DIAGNOSIS — E55.9 VITAMIN D INSUFFICIENCY: ICD-10-CM

## 2024-02-01 RX ORDER — POTASSIUM CHLORIDE 750 MG/1
10 TABLET, FILM COATED, EXTENDED RELEASE ORAL DAILY
Qty: 90 TABLET | Refills: 3 | Status: SHIPPED | OUTPATIENT
Start: 2024-02-01

## 2024-02-01 RX ORDER — ERGOCALCIFEROL 1.25 MG/1
1.25 CAPSULE ORAL 2 TIMES WEEKLY
Qty: 26 CAPSULE | Refills: 3 | Status: SHIPPED | OUTPATIENT
Start: 2024-02-01 | End: 2024-03-08 | Stop reason: DRUGHIGH

## 2024-02-01 NOTE — TELEPHONE ENCOUNTER
Rx Refill Request Telephone Encounter    Name:  Angelia Jimenez  :  [id number]  Medication Name:  potassium chloride CR 10 mEq ER tablet     oral  daily  90  Specific Pharmacy location:  [facility]  Date of last appointment:    Date of next appointment:  3/8  Best number to reach patient:  [phone]          Rx Refill Request Telephone Encounter    Name:  Angelia Jimenez  :  [id number]  Medication Name:  Vitamin D-25-Hydroxy    oral  twice a week  30    Specific Pharmacy location:    Date of last appointment:    Date of next appointment:    Best number to reach patient:

## 2024-02-26 ENCOUNTER — TELEPHONE (OUTPATIENT)
Dept: PRIMARY CARE | Facility: CLINIC | Age: 59
End: 2024-02-26
Payer: MEDICARE

## 2024-02-26 DIAGNOSIS — G89.29 CHRONIC RIGHT SHOULDER PAIN: ICD-10-CM

## 2024-02-26 DIAGNOSIS — M25.511 CHRONIC RIGHT SHOULDER PAIN: ICD-10-CM

## 2024-02-26 RX ORDER — OXYCODONE AND ACETAMINOPHEN 10; 325 MG/1; MG/1
1 TABLET ORAL EVERY 6 HOURS PRN
Qty: 90 TABLET | Refills: 0 | Status: SHIPPED | OUTPATIENT
Start: 2024-02-26 | End: 2024-03-22 | Stop reason: SDUPTHER

## 2024-02-26 RX ORDER — OXYCODONE AND ACETAMINOPHEN 10; 325 MG/1; MG/1
1 TABLET ORAL EVERY 6 HOURS PRN
Qty: 90 TABLET | Refills: 0 | Status: SHIPPED | OUTPATIENT
Start: 2024-02-26 | End: 2024-02-26 | Stop reason: SDUPTHER

## 2024-02-26 NOTE — TELEPHONE ENCOUNTER
The prescription was unable to be sent eletronically and it printed. Would the patient like to come  the prescription?

## 2024-02-26 NOTE — TELEPHONE ENCOUNTER
Rx Refill Request Telephone Encounter    Name:  Angelia Jimenez  :  [id number]  Medication Name:        oxyCODONE-acetaminophen (Percocet)  mg tablet   Route: Take 1 tablet by mouth every 6 hours if needed for severe pain (7 - 10).              Specific Pharmacy location:  [id number] [address] [facility]   Date of last appointment:  23  Date of next appointment:  3/8/24  Best number to reach patient:     [phone]

## 2024-03-02 ENCOUNTER — LAB (OUTPATIENT)
Dept: LAB | Facility: LAB | Age: 59
End: 2024-03-02
Payer: MEDICARE

## 2024-03-02 DIAGNOSIS — E53.8 VITAMIN B12 DEFICIENCY: ICD-10-CM

## 2024-03-02 DIAGNOSIS — E78.2 HYPERLIPEMIA, MIXED: ICD-10-CM

## 2024-03-02 DIAGNOSIS — Z11.59 ENCOUNTER FOR HEPATITIS C SCREENING TEST FOR LOW RISK PATIENT: ICD-10-CM

## 2024-03-02 LAB
ALBUMIN SERPL BCP-MCNC: 4.5 G/DL (ref 3.4–5)
ALP SERPL-CCNC: 63 U/L (ref 33–136)
ALT SERPL W P-5'-P-CCNC: 13 U/L (ref 7–45)
ANION GAP SERPL CALC-SCNC: 12 MMOL/L (ref 10–20)
AST SERPL W P-5'-P-CCNC: 17 U/L (ref 9–39)
BILIRUB SERPL-MCNC: 0.4 MG/DL (ref 0–1.2)
BUN SERPL-MCNC: 11 MG/DL (ref 6–23)
CALCIUM SERPL-MCNC: 9.7 MG/DL (ref 8.6–10.3)
CHLORIDE SERPL-SCNC: 106 MMOL/L (ref 98–107)
CHOLEST SERPL-MCNC: 168 MG/DL (ref 0–199)
CHOLESTEROL/HDL RATIO: 2.1
CO2 SERPL-SCNC: 26 MMOL/L (ref 21–32)
CREAT SERPL-MCNC: 0.91 MG/DL (ref 0.5–1.05)
EGFRCR SERPLBLD CKD-EPI 2021: 72 ML/MIN/1.73M*2
GLUCOSE SERPL-MCNC: 87 MG/DL (ref 74–99)
HCV AB SER QL: NONREACTIVE
HDLC SERPL-MCNC: 78.2 MG/DL
LDLC SERPL CALC-MCNC: 57 MG/DL
LDLC SERPL DIRECT ASSAY-MCNC: 74 MG/DL (ref 0–129)
NON HDL CHOLESTEROL: 90 MG/DL (ref 0–149)
POTASSIUM SERPL-SCNC: 4.3 MMOL/L (ref 3.5–5.3)
PROT SERPL-MCNC: 6.8 G/DL (ref 6.4–8.2)
SODIUM SERPL-SCNC: 140 MMOL/L (ref 136–145)
TRIGL SERPL-MCNC: 164 MG/DL (ref 0–149)
VIT B12 SERPL-MCNC: 106 PG/ML (ref 211–911)
VLDL: 33 MG/DL (ref 0–40)

## 2024-03-02 PROCEDURE — 80053 COMPREHEN METABOLIC PANEL: CPT

## 2024-03-02 PROCEDURE — 82652 VIT D 1 25-DIHYDROXY: CPT

## 2024-03-02 PROCEDURE — 36415 COLL VENOUS BLD VENIPUNCTURE: CPT

## 2024-03-02 PROCEDURE — 83721 ASSAY OF BLOOD LIPOPROTEIN: CPT

## 2024-03-02 PROCEDURE — 80061 LIPID PANEL: CPT

## 2024-03-02 PROCEDURE — 82607 VITAMIN B-12: CPT

## 2024-03-02 PROCEDURE — 86803 HEPATITIS C AB TEST: CPT

## 2024-03-04 PROBLEM — S42.201A CLOSED FRACTURE OF RIGHT PROXIMAL HUMERUS: Status: RESOLVED | Noted: 2023-12-11 | Resolved: 2024-03-04

## 2024-03-04 PROBLEM — Z90.49 HISTORY OF COLECTOMY: Status: RESOLVED | Noted: 2023-12-05 | Resolved: 2024-03-04

## 2024-03-04 PROBLEM — N18.2 CKD (CHRONIC KIDNEY DISEASE) STAGE 2, GFR 60-89 ML/MIN: Status: ACTIVE | Noted: 2024-03-04

## 2024-03-04 PROBLEM — Z00.00 MEDICARE ANNUAL WELLNESS VISIT, SUBSEQUENT: Status: ACTIVE | Noted: 2024-03-04

## 2024-03-04 NOTE — PATIENT INSTRUCTIONS
Start losartan as directed. Continue to monitor blood pressure.  Call if blood pressure consistently >140/90.  Low salt diet recommended.  Increase physical activity as able.  Reevaluate in 6 weeks..    I recommend RSV vaccine, Shingrix, new COVID booster, and Boostrix at the pharmacy.    I would like you to follow up in 6 months  Please have all labs that were ordered done at least 1 week prior to your visit.    Recommend healthy diet based around fruits, vegetables, and lean proteins such as chicken, turkey, fish, and beans.  Also include moderate portions of healthy carbohydrates such as wheat bread and pasta, sweet potatoes. Limit sweets and alcoholic beverages. Try not drink more than 100 calories in beverages daily.   It is important to get a protein-rich breakfast daily such as oatmeal, eggs or Greek yogurt.  Increase activity as able to a recommended goal of at least 30 minutes of cardiovascular exercise (walking, swimming, biking, jogging etc.) at least 5 days weekly and a goal of 45 minutes or more most days of the week for weight loss. This exercise can be done all at one time or broken up into 2 or more sessions throughout the day.

## 2024-03-04 NOTE — PROGRESS NOTES
"Subjective :  Chief Complaint: Angelia Jimenez is an 58 y.o. female here for an annual Medicare Wellness visit, annual physical, and follow up labs and chronic conditions.      Patient feels well. No other complaints or concerns.    I have reviewed and reconciled the medication list with the patient today.    Current Outpatient Medications:     cyanocobalamin (Vitamin B-12) 1,000 mcg tablet, Take 1 tablet (1,000 mcg) by mouth once daily., Disp: , Rfl:     cyanocobalamin (Vitamin B-12) 1,000 mcg/mL injection, Inject 1 mL (1,000 mcg) into the shoulder, thigh, or buttocks every 30 (thirty) days., Disp: 1 mL, Rfl: 11    naloxone (Narcan) 4 mg/0.1 mL nasal spray, Administer 1 spray (4 mg) into affected nostril(s) if needed., Disp: , Rfl:     oxyCODONE-acetaminophen (Percocet)  mg tablet, Take 1 tablet by mouth every 6 hours if needed for severe pain (7 - 10)., Disp: 90 tablet, Rfl: 0    polyethylene glycol (Glycolax) 17 gram/dose powder, Take 17 g by mouth once daily., Disp: , Rfl:     potassium chloride CR 10 mEq ER tablet, Take 1 tablet (10 mEq) by mouth once daily., Disp: 90 tablet, Rfl: 3    SUMAtriptan (Imitrex) 100 mg tablet, Take by mouth., Disp: , Rfl:     syringe with needle (Syringe 3cc/25Gx1\") 3 mL 25 gauge x 1\" syringe, 1 Syringe every 30 (thirty) days., Disp: 100 each, Rfl: 0    ergocalciferol (Vitamin D-2) 1.25 MG (01522 UT) capsule, Take 1 capsule (1,250 mcg) by mouth 1 (one) time per week., Disp: 12 capsule, Rfl: 1    furosemide (Lasix) 40 mg tablet, Take 1 tablet (40 mg) by mouth 2 times a day., Disp: 180 tablet, Rfl: 1    losartan (Cozaar) 50 mg tablet, Take 1 tablet (50 mg) by mouth once daily., Disp: 90 tablet, Rfl: 3    The patient's relevant past medical, surgical, family and social history was reviewed in [facility].  All pertinent lab work and results for this visit were reviewed with patient.    Lab on 03/02/2024   Component Date Value Ref Range Status    LDL, Direct 03/02/2024 74  0 - 129 mg/dL " Final    Cholesterol 03/02/2024 168  0 - 199 mg/dL Final          Age      Desirable   Borderline High   High     0-19 Y     0 - 169       170 - 199     >/= 200    20-24 Y     0 - 189       190 - 224     >/= 225         >24 Y     0 - 199       200 - 239     >/= 240   **All ranges are based on fasting samples. Specific   therapeutic targets will vary based on patient-specific   cardiac risk.    Pediatric guidelines reference:Pediatrics 2011, 128(S5).Adult guidelines reference: NCEP ATPIII Guidelines,DESIRAE 2001, 258:2486-97    Venipuncture immediately after or during the administration of Metamizole may lead to falsely low results. Testing should be performed immediately prior to Metamizole dosing.    HDL-Cholesterol 03/02/2024 78.2  mg/dL Final      Age       Very Low   Low     Normal    High    0-19 Y    < 35      < 40     40-45     ----  20-24 Y    ----     < 40      >45      ----        >24 Y      ----     < 40     40-60      >60      Cholesterol/HDL Ratio 03/02/2024 2.1   Final      Ref Values  Desirable  < 3.4  High Risk  > 5.0    LDL Calculated 03/02/2024 57  <=99 mg/dL Final                                Near   Borderline      AGE      Desirable  Optimal    High     High     Very High     0-19 Y     0 - 109     ---    110-129   >/= 130     ----    20-24 Y     0 - 119     ---    120-159   >/= 160     ----      >24 Y     0 -  99   100-129  130-159   160-189     >/=190      VLDL 03/02/2024 33  0 - 40 mg/dL Final    Triglycerides 03/02/2024 164 (H)  0 - 149 mg/dL Final       Age         Desirable   Borderline High   High     Very High   0 D-90 D    19 - 174         ----         ----        ----  91 D- 9 Y     0 -  74        75 -  99     >/= 100      ----    10-19 Y     0 -  89        90 - 129     >/= 130      ----    20-24 Y     0 - 114       115 - 149     >/= 150      ----         >24 Y     0 - 149       150 - 199    200- 499    >/= 500    Venipuncture immediately after or during the administration of Metamizole  may lead to falsely low results. Testing should be performed immediately prior to Metamizole dosing.    Non HDL Cholesterol 03/02/2024 90  0 - 149 mg/dL Final          Age       Desirable   Borderline High   High     Very High     0-19 Y     0 - 119       120 - 144     >/= 145    >/= 160    20-24 Y     0 - 149       150 - 189     >/= 190      ----         >24 Y    30 mg/dL above LDL Cholesterol goal      Glucose 03/02/2024 87  74 - 99 mg/dL Final    Sodium 03/02/2024 140  136 - 145 mmol/L Final    Potassium 03/02/2024 4.3  3.5 - 5.3 mmol/L Final    Chloride 03/02/2024 106  98 - 107 mmol/L Final    Bicarbonate 03/02/2024 26  21 - 32 mmol/L Final    Anion Gap 03/02/2024 12  10 - 20 mmol/L Final    Urea Nitrogen 03/02/2024 11  6 - 23 mg/dL Final    Creatinine 03/02/2024 0.91  0.50 - 1.05 mg/dL Final    eGFR 03/02/2024 72  >60 mL/min/1.73m*2 Final    Calculations of estimated GFR are performed using the 2021 CKD-EPI Study Refit equation without the race variable for the IDMS-Traceable creatinine methods.  https://jasn.asnjournals.org/content/early/2021/09/22/ASN.3954159737    Calcium 03/02/2024 9.7  8.6 - 10.3 mg/dL Final    Albumin 03/02/2024 4.5  3.4 - 5.0 g/dL Final    Alkaline Phosphatase 03/02/2024 63  33 - 136 U/L Final    Total Protein 03/02/2024 6.8  6.4 - 8.2 g/dL Final    AST 03/02/2024 17  9 - 39 U/L Final    Bilirubin, Total 03/02/2024 0.4  0.0 - 1.2 mg/dL Final    ALT 03/02/2024 13  7 - 45 U/L Final    Patients treated with Sulfasalazine may generate falsely decreased results for ALT.    Vitamin B12 03/02/2024 106 (L)  211 - 911 pg/mL Final    Vit D, 1,25-Dihydroxy 03/02/2024 94.7 (H)  19.9 - 79.3 pg/mL Final    INTERPRETIVE INFORMATION: Vitamin D, 1,25-Dihydroxy    This test is primarily indicated during patient evaluation for   hypercalcemia and renal failure. A normal result does not rule out   Vitamin D deficiency. The recommended test for diagnosing Vitamin   D deficiency is Vitamin D  "25-hydroxy.  Performed By: [facility]  [address] [place] [address]  : Marek Lin MD, PhD  CLIA Number: [id number]    Hepatitis C AB 03/02/2024 Nonreactive  Nonreactive Final    Results from patients taking biotin supplements or receiving high-dose biotin therapy should be interpreted with caution due to possible interference with this test. Providers may contact their local laboratory for further information.         Review of Systems   A complete review of systems was performed and all systems were normal except what is noted in the HPI.      List of current healthcare providers:  Patient Care Team:  Barbara Pulido MD as PCP - General  Barbara Pulido MD as PCP - [employer] PCP  Jason Castellanos MD as Consulting Physician (Interventional Cardiology)        Over the past 2 weeks, how often have you been bothered by any of the following problems?  Little interest or pleasure in doing things: Not at all  Feeling down, depressed, or hopeless: Not at all  Patient Health Questionnaire-2 Score: 0             Advance Care Planning:    Living Will: Yes  POA: Yes    Objective :  BP (!) 153/91   Pulse 78   Temp 36.2 °C (97.1 °F)   Ht 1.6 m (5' 3\")   Wt 79.4 kg (175 lb)   SpO2 97%   BMI 31.00 kg/m²    No results found.  Physical Exam  Constitutional:       Appearance: Normal appearance. She is obese.   HENT:      Head: Normocephalic and atraumatic.   Neck:      Vascular: No carotid bruit.   Cardiovascular:      Rate and Rhythm: Normal rate and regular rhythm.      Heart sounds: Normal heart sounds.   Pulmonary:      Effort: Pulmonary effort is normal.      Breath sounds: Normal breath sounds. No wheezing, rhonchi or rales.   Abdominal:      General: Abdomen is flat. Bowel sounds are normal.      Palpations: Abdomen is soft.      Tenderness: There is no abdominal tenderness. There is no guarding.   Musculoskeletal:         General: Normal range of motion.    "   Right lower leg: No edema.      Left lower leg: No edema.   Skin:     General: Skin is dry.   Neurological:      General: No focal deficit present.      Mental Status: She is alert and oriented to person, place, and time.   Psychiatric:         Mood and Affect: Mood normal.         Behavior: Behavior normal.         Thought Content: Thought content normal.         Assessment/Plan :  Problem List Items Addressed This Visit       Chronic narcotic use     Pain well controlled with no side effects.    drug screen obtained.  Controlled med contract reviewed with patient.  I have personally reviewed the OARRS report.  This report is scanned into the electronic medical record.  I have considered the risks of abuse, dependence, addiction and diversion.  I believe that it is clinically appropriate to prescribe this medication.               Relevant Orders    POCT waived urine drug screen manually resulted    Current smoker     Encourage smoking cessation - not ready to quit - down to 2-3/day  CT lung reordered          Relevant Orders    CT lung screening low dose    Hyperlipemia, mixed     triglycerides improved from 201 to 164  LDL at goal  Work on diet reviewed with patient.   Recheck 6 months          Relevant Orders    TSH with reflex to Free T4 if abnormal    Lipid Panel    Cholesterol, LDL Direct    Comprehensive Metabolic Panel    Vitamin B12 deficiency     B12 low but done right before injection was due  Continue current medication   Recheck 6 months          Relevant Orders    CBC    Vitamin B12    Vitamin D insufficiency     Decrease to once weekly  Reevaluate in 6 months.            Relevant Medications    ergocalciferol (Vitamin D-2) 1.25 MG (22655 UT) capsule    Other Relevant Orders    Vitamin D 25-Hydroxy,Total (for eval of Vitamin D levels)    Obesity, Class I, BMI 30-34.9     Work on diet reviewed with patient.   Increase physical activity as able          Nicotine dependence, cigarettes, uncomplicated     Relevant Orders    CT lung screening low dose    Medicare annual wellness visit, subsequent - Primary     Medical Wellness exam done.  S/p ROGERIO  Mammogram ordered  Prevnar 20 9/23         CKD (chronic kidney disease) stage 2, GFR 60-89 ml/min     Stable  ACR today  Start losartan as directed   Encourage smoking cessation.  Low salt diet  Recheck 6 months          Relevant Medications    losartan (Cozaar) 50 mg tablet    Other Relevant Orders    Comprehensive Metabolic Panel    Albumin , Urine Random    Albumin , Urine Random    Basic metabolic panel    Primary hypertension     Start losartan as directed - Risks, benefits and side effects reviewed with patient.   BMP 2 weeks  Recheck 6 weeks         Relevant Medications    losartan (Cozaar) 50 mg tablet    Other Relevant Orders    Basic metabolic panel     Other Visit Diagnoses       Annual physical exam        Yearly physical done    Breast screening        Relevant Orders    BI mammo bilateral screening tomosynthesis    Encounter for screening mammogram for malignant neoplasm of breast        Relevant Orders    BI mammo bilateral screening tomosynthesis    Health maintenance examination        Relevant Medications    furosemide (Lasix) 40 mg tablet    Medication management        Relevant Orders    POCT waived urine drug screen manually resulted               The following health maintenance schedule was reviewed with the patient and provided in printed form in the after visit summary:  Health Maintenance   Topic Date Due    Medicare Annual Wellness Visit (AWV)  Never done    HIV Screening  Never done    DTaP/Tdap/Td Vaccines (1 - Tdap) Never done    Zoster Vaccines (1 of 2) Never done    Influenza Vaccine (1) 09/01/2023    COVID-19 Vaccine (4 - 2023-24 season) 09/01/2023    Mammogram  02/22/2024    Diabetes Screening  09/07/2024    Lipid Panel  03/02/2029    Pneumococcal Vaccine: Pediatrics (0 to 5 Years) and At-Risk Patients (6 to 64 Years)  Completed     Hepatitis C Screening  Completed    HIB Vaccines  Aged Out    Hepatitis B Vaccines  Aged Out    IPV Vaccines  Aged Out    Hepatitis A Vaccines  Aged Out    Meningococcal Vaccine  Aged Out    Rotavirus Vaccines  Aged Out    HPV Vaccines  Aged Out    MMR Vaccines  Discontinued    Cervical Cancer Screening  Discontinued           Patient understands and agrees with treatment plan.          Barbara Pulido MD

## 2024-03-04 NOTE — ASSESSMENT & PLAN NOTE
Pain well controlled with no side effects.    drug screen obtained.  Controlled med contract reviewed with patient.  I have personally reviewed the OARRS report.  This report is scanned into the electronic medical record.  I have considered the risks of abuse, dependence, addiction and diversion.  I believe that it is clinically appropriate to prescribe this medication.

## 2024-03-04 NOTE — ASSESSMENT & PLAN NOTE
Stable  ACR today  Start losartan as directed   Encourage smoking cessation.  Low salt diet  Recheck 6 months

## 2024-03-04 NOTE — ASSESSMENT & PLAN NOTE
triglycerides improved from 201 to 164  LDL at goal  Work on diet reviewed with patient.   Recheck 6 months

## 2024-03-05 ENCOUNTER — PATIENT OUTREACH (OUTPATIENT)
Dept: CARE COORDINATION | Facility: CLINIC | Age: 59
End: 2024-03-05
Payer: MEDICARE

## 2024-03-05 LAB — 1,25(OH)2D3 SERPL-MCNC: 94.7 PG/ML (ref 19.9–79.3)

## 2024-03-08 ENCOUNTER — OFFICE VISIT (OUTPATIENT)
Dept: PRIMARY CARE | Facility: CLINIC | Age: 59
End: 2024-03-08
Payer: MEDICARE

## 2024-03-08 VITALS
WEIGHT: 175 LBS | HEART RATE: 78 BPM | OXYGEN SATURATION: 97 % | BODY MASS INDEX: 31.01 KG/M2 | HEIGHT: 63 IN | DIASTOLIC BLOOD PRESSURE: 91 MMHG | SYSTOLIC BLOOD PRESSURE: 153 MMHG | TEMPERATURE: 97.1 F

## 2024-03-08 DIAGNOSIS — E53.8 VITAMIN B12 DEFICIENCY: ICD-10-CM

## 2024-03-08 DIAGNOSIS — Z79.899 MEDICATION MANAGEMENT: ICD-10-CM

## 2024-03-08 DIAGNOSIS — Z00.00 HEALTH MAINTENANCE EXAMINATION: ICD-10-CM

## 2024-03-08 DIAGNOSIS — Z00.00 MEDICARE ANNUAL WELLNESS VISIT, SUBSEQUENT: Primary | ICD-10-CM

## 2024-03-08 DIAGNOSIS — F11.90 CHRONIC NARCOTIC USE: ICD-10-CM

## 2024-03-08 DIAGNOSIS — Z12.39 BREAST SCREENING: ICD-10-CM

## 2024-03-08 DIAGNOSIS — N18.2 CKD (CHRONIC KIDNEY DISEASE) STAGE 2, GFR 60-89 ML/MIN: ICD-10-CM

## 2024-03-08 DIAGNOSIS — E55.9 VITAMIN D INSUFFICIENCY: ICD-10-CM

## 2024-03-08 DIAGNOSIS — F17.200 CURRENT SMOKER: ICD-10-CM

## 2024-03-08 DIAGNOSIS — E78.2 HYPERLIPEMIA, MIXED: ICD-10-CM

## 2024-03-08 DIAGNOSIS — F17.210 NICOTINE DEPENDENCE, CIGARETTES, UNCOMPLICATED: ICD-10-CM

## 2024-03-08 DIAGNOSIS — I10 PRIMARY HYPERTENSION: ICD-10-CM

## 2024-03-08 DIAGNOSIS — Z12.31 ENCOUNTER FOR SCREENING MAMMOGRAM FOR MALIGNANT NEOPLASM OF BREAST: ICD-10-CM

## 2024-03-08 DIAGNOSIS — E66.9 OBESITY, CLASS I, BMI 30-34.9: ICD-10-CM

## 2024-03-08 DIAGNOSIS — Z00.00 ANNUAL PHYSICAL EXAM: ICD-10-CM

## 2024-03-08 LAB
POC AMPHETAMINE: NEGATIVE NG/ML
POC BARBITURATES: NEGATIVE NG/ML
POC BENZODIAZEPINES: NEGATIVE NG/ML
POC BUPRENORPHINE URINE: NEGATIVE NG/ML
POC COCAINE: NEGATIVE NG/ML
POC MDMA (NG/ML) IN URINE: NEGATIVE NG/ML
POC METHADONE MANUALLY ENTERED: NEGATIVE NG/ML
POC METHAMPHETAMINE: NEGATIVE NG/ML
POC MORPHINE URINE: NEGATIVE NG/ML
POC OPIATES: ABNORMAL NG/ML
POC OXYCODONE: POSITIVE NG/ML
POC PHENCYCLIDINE (PCP): NEGATIVE NG/ML
POC THC: NEGATIVE NG/ML
POC TICYCLIC ANTIDEPRESSANTS (TCA): ABNORMAL NG/ML

## 2024-03-08 PROCEDURE — 3080F DIAST BP >= 90 MM HG: CPT | Performed by: FAMILY MEDICINE

## 2024-03-08 PROCEDURE — 99396 PREV VISIT EST AGE 40-64: CPT | Performed by: FAMILY MEDICINE

## 2024-03-08 PROCEDURE — 80305 DRUG TEST PRSMV DIR OPT OBS: CPT | Performed by: FAMILY MEDICINE

## 2024-03-08 PROCEDURE — G0439 PPPS, SUBSEQ VISIT: HCPCS | Performed by: FAMILY MEDICINE

## 2024-03-08 PROCEDURE — 82043 UR ALBUMIN QUANTITATIVE: CPT

## 2024-03-08 PROCEDURE — 82570 ASSAY OF URINE CREATININE: CPT

## 2024-03-08 PROCEDURE — 99214 OFFICE O/P EST MOD 30 MIN: CPT | Performed by: FAMILY MEDICINE

## 2024-03-08 PROCEDURE — 3077F SYST BP >= 140 MM HG: CPT | Performed by: FAMILY MEDICINE

## 2024-03-08 RX ORDER — LOSARTAN POTASSIUM 50 MG/1
50 TABLET ORAL DAILY
Qty: 90 TABLET | Refills: 3 | Status: SHIPPED | OUTPATIENT
Start: 2024-03-08 | End: 2024-04-19 | Stop reason: SDUPTHER

## 2024-03-08 RX ORDER — FUROSEMIDE 40 MG/1
40 TABLET ORAL 2 TIMES DAILY
Qty: 180 TABLET | Refills: 1 | Status: SHIPPED | OUTPATIENT
Start: 2024-03-08

## 2024-03-08 RX ORDER — ERGOCALCIFEROL 1.25 MG/1
1.25 CAPSULE ORAL
Qty: 12 CAPSULE | Refills: 1 | Status: SHIPPED
Start: 2024-03-08 | End: 2025-03-08

## 2024-03-08 ASSESSMENT — ACTIVITIES OF DAILY LIVING (ADL)
TAKING_MEDICATION: INDEPENDENT
BATHING: INDEPENDENT
DRESSING: INDEPENDENT
MANAGING_FINANCES: INDEPENDENT
DOING_HOUSEWORK: INDEPENDENT
GROCERY_SHOPPING: INDEPENDENT

## 2024-03-08 ASSESSMENT — ENCOUNTER SYMPTOMS
LOSS OF SENSATION IN FEET: 0
DEPRESSION: 0
OCCASIONAL FEELINGS OF UNSTEADINESS: 0

## 2024-03-08 NOTE — ASSESSMENT & PLAN NOTE
Start losartan as directed - Risks, benefits and side effects reviewed with patient.   BMP 2 weeks  Recheck 6 weeks

## 2024-03-09 LAB
CREAT UR-MCNC: 41.6 MG/DL (ref 20–320)
MICROALBUMIN UR-MCNC: 11.2 MG/L
MICROALBUMIN/CREAT UR: 26.9 UG/MG CREAT

## 2024-03-15 ENCOUNTER — APPOINTMENT (OUTPATIENT)
Dept: RADIOLOGY | Facility: CLINIC | Age: 59
End: 2024-03-15
Payer: MEDICARE

## 2024-03-18 ENCOUNTER — APPOINTMENT (OUTPATIENT)
Dept: RADIOLOGY | Facility: CLINIC | Age: 59
End: 2024-03-18
Payer: MEDICARE

## 2024-03-22 ENCOUNTER — TELEPHONE (OUTPATIENT)
Dept: PRIMARY CARE | Facility: CLINIC | Age: 59
End: 2024-03-22
Payer: MEDICARE

## 2024-03-22 DIAGNOSIS — G89.29 CHRONIC RIGHT SHOULDER PAIN: ICD-10-CM

## 2024-03-22 DIAGNOSIS — M25.511 CHRONIC RIGHT SHOULDER PAIN: ICD-10-CM

## 2024-03-22 RX ORDER — OXYCODONE AND ACETAMINOPHEN 10; 325 MG/1; MG/1
1 TABLET ORAL EVERY 6 HOURS PRN
Qty: 90 TABLET | Refills: 0 | Status: SHIPPED | OUTPATIENT
Start: 2024-03-22 | End: 2024-04-19 | Stop reason: SDUPTHER

## 2024-03-22 NOTE — TELEPHONE ENCOUNTER
Rx Refill Request Telephone Encounter    Name:  Angelia Jimenez  :  [id number]  Medication Name:      oxyCODONE-acetaminophen (Percocet)  mg tablet     Route: Take 1 tablet by mouth every 6 hours if needed for severe pain (7 - 10)      Specific Pharmacy location:  [facility] [id number] [facility] [address] [facility]   Date of last appointment:  3/8/24  Date of next appointment:  24  Best number to reach patient: [phone]

## 2024-04-01 ENCOUNTER — APPOINTMENT (OUTPATIENT)
Dept: RADIOLOGY | Facility: CLINIC | Age: 59
End: 2024-04-01
Payer: MEDICARE

## 2024-04-17 ENCOUNTER — LAB (OUTPATIENT)
Dept: LAB | Facility: LAB | Age: 59
End: 2024-04-17
Payer: MEDICARE

## 2024-04-17 DIAGNOSIS — N18.2 CKD (CHRONIC KIDNEY DISEASE) STAGE 2, GFR 60-89 ML/MIN: ICD-10-CM

## 2024-04-17 DIAGNOSIS — I10 PRIMARY HYPERTENSION: ICD-10-CM

## 2024-04-17 LAB
ANION GAP SERPL CALC-SCNC: 11 MMOL/L (ref 10–20)
BUN SERPL-MCNC: 15 MG/DL (ref 6–23)
CALCIUM SERPL-MCNC: 9.3 MG/DL (ref 8.6–10.3)
CHLORIDE SERPL-SCNC: 112 MMOL/L (ref 98–107)
CO2 SERPL-SCNC: 24 MMOL/L (ref 21–32)
CREAT SERPL-MCNC: 0.87 MG/DL (ref 0.5–1.05)
EGFRCR SERPLBLD CKD-EPI 2021: 76 ML/MIN/1.73M*2
GLUCOSE SERPL-MCNC: 85 MG/DL (ref 74–99)
POTASSIUM SERPL-SCNC: 3.8 MMOL/L (ref 3.5–5.3)
SODIUM SERPL-SCNC: 143 MMOL/L (ref 136–145)

## 2024-04-17 PROCEDURE — 36415 COLL VENOUS BLD VENIPUNCTURE: CPT

## 2024-04-17 PROCEDURE — 80048 BASIC METABOLIC PNL TOTAL CA: CPT

## 2024-04-19 ENCOUNTER — OFFICE VISIT (OUTPATIENT)
Dept: PRIMARY CARE | Facility: CLINIC | Age: 59
End: 2024-04-19
Payer: MEDICARE

## 2024-04-19 VITALS
OXYGEN SATURATION: 96 % | WEIGHT: 169 LBS | HEIGHT: 63 IN | TEMPERATURE: 97 F | BODY MASS INDEX: 29.95 KG/M2 | HEART RATE: 88 BPM | RESPIRATION RATE: 18 BRPM

## 2024-04-19 DIAGNOSIS — I10 PRIMARY HYPERTENSION: ICD-10-CM

## 2024-04-19 DIAGNOSIS — M25.511 CHRONIC RIGHT SHOULDER PAIN: ICD-10-CM

## 2024-04-19 DIAGNOSIS — G89.29 CHRONIC RIGHT SHOULDER PAIN: ICD-10-CM

## 2024-04-19 DIAGNOSIS — N18.2 CKD (CHRONIC KIDNEY DISEASE) STAGE 2, GFR 60-89 ML/MIN: ICD-10-CM

## 2024-04-19 PROCEDURE — 99213 OFFICE O/P EST LOW 20 MIN: CPT | Performed by: NURSE PRACTITIONER

## 2024-04-19 RX ORDER — OXYCODONE AND ACETAMINOPHEN 10; 325 MG/1; MG/1
1 TABLET ORAL EVERY 6 HOURS PRN
Qty: 90 TABLET | Refills: 0 | Status: SHIPPED | OUTPATIENT
Start: 2024-04-19 | End: 2024-05-13 | Stop reason: SDUPTHER

## 2024-04-19 RX ORDER — LOSARTAN POTASSIUM 50 MG/1
75 TABLET ORAL DAILY
Qty: 135 TABLET | Refills: 1 | Status: SHIPPED | OUTPATIENT
Start: 2024-04-19 | End: 2024-10-16

## 2024-04-19 ASSESSMENT — ENCOUNTER SYMPTOMS
SHORTNESS OF BREATH: 0
WEAKNESS: 0
COUGH: 0
FEVER: 0
PALPITATIONS: 0
HYPERTENSION: 1
DIZZINESS: 0
FATIGUE: 0
WHEEZING: 0
HEADACHES: 0
CHILLS: 0

## 2024-04-19 NOTE — ASSESSMENT & PLAN NOTE
Increase losartan to 75 mg 1 p.o. daily.  Take 1-1/2 tab to equal 75 mg  Follow-up at routine office visit  No added salt diet   call for consistently elevated BP greater than 140/90

## 2024-04-19 NOTE — PROGRESS NOTES
"Subjective   Patient ID: Angelia Jimenez is a 59 y.o. female who presents for Hypertension (6 week follow up, started losartan 50 mg).    Hypertension: Patient started on losartan 50mg with PCP on 3/8/2024  Denies symptoms  Has less \headaches on medication   BP improved on recheck / slightly elevated still but feels better on medication       .  Chemistry Panel Reviewed     Lab Results       Component                Value               Date/Time                  NA                       143                 04/17/2024 1315            K                        3.8                 04/17/2024 1315            CL                       112 (H)             04/17/2024 1315            CO2                      24                  04/17/2024 1315            BUN                      15                  04/17/2024 1315            CREATININE               0.87                04/17/2024 1315      Lab Results       Component                Value               Date/Time                  CALCIUM                  9.3                 04/17/2024 1315            ALKPHOS                  63                  03/02/2024 0902            AST                      17                  03/02/2024 0902            ALT                      13                  03/02/2024 0902            BILITOT                  0.4                 03/02/2024 0902            Hypertension  Pertinent negatives include no chest pain, headaches, palpitations or shortness of breath.        Review of Systems   Constitutional:  Negative for chills, fatigue and fever.   Respiratory:  Negative for cough, shortness of breath and wheezing.    Cardiovascular:  Negative for chest pain and palpitations.   Neurological:  Negative for dizziness, weakness and headaches.       Objective   Pulse 88   Temp 36.1 °C (97 °F) (Temporal)   Resp 18   Ht 1.6 m (5' 3\")   Wt 76.7 kg (169 lb)   SpO2 96%   BMI 29.94 kg/m²     Physical Exam  Vitals reviewed.   Constitutional:       Appearance: She " is obese.   Cardiovascular:      Rate and Rhythm: Normal rate and regular rhythm.      Pulses: Normal pulses.      Heart sounds: Normal heart sounds.   Pulmonary:      Effort: Pulmonary effort is normal.      Breath sounds: Normal breath sounds.   Neurological:      Mental Status: She is alert and oriented to person, place, and time.   Psychiatric:         Mood and Affect: Mood normal.         Behavior: Behavior normal.         Assessment/Plan   Problem List Items Addressed This Visit             ICD-10-CM    Chronic right shoulder pain M25.511, G89.29     Refill on percocet          Relevant Medications    oxyCODONE-acetaminophen (Percocet)  mg tablet    CKD (chronic kidney disease) stage 2, GFR 60-89 ml/min N18.2    Relevant Medications    losartan (Cozaar) 50 mg tablet    Primary hypertension I10     Increase losartan to 75 mg 1 p.o. daily.  Take 1-1/2 tab to equal 75 mg  Follow-up at routine office visit  No added salt diet   call for consistently elevated BP greater than 140/90           Relevant Medications    losartan (Cozaar) 50 mg tablet

## 2024-05-13 ENCOUNTER — TELEPHONE (OUTPATIENT)
Dept: PRIMARY CARE | Facility: CLINIC | Age: 59
End: 2024-05-13
Payer: MEDICARE

## 2024-05-13 DIAGNOSIS — M25.511 CHRONIC RIGHT SHOULDER PAIN: ICD-10-CM

## 2024-05-13 DIAGNOSIS — G89.29 CHRONIC RIGHT SHOULDER PAIN: ICD-10-CM

## 2024-05-13 RX ORDER — OXYCODONE AND ACETAMINOPHEN 10; 325 MG/1; MG/1
1 TABLET ORAL EVERY 6 HOURS PRN
Qty: 90 TABLET | Refills: 0 | Status: SHIPPED | OUTPATIENT
Start: 2024-05-13

## 2024-05-13 NOTE — TELEPHONE ENCOUNTER
Percocet ordered today has a may refill after 4/18      Was that meant to be 5/18/24?    Pharmacy phone [phone]    This message was a voice mail from 5/13  at   10:25

## 2024-05-13 NOTE — TELEPHONE ENCOUNTER
Rx Refill Request Telephone Encounter    Name:  Angelia Jimenez  :  [id number]  Medication Name:      oxyCODONE-acetaminophen (Percocet)  mg tablet     Patient takes medication 3 times a day.      Specific Pharmacy location:  [facility] [id number] [facility] [address] [facility]  Date of last appointment:  24  Date of next appointment: 24   Best number to reach patient:

## 2024-05-29 ENCOUNTER — TELEPHONE (OUTPATIENT)
Dept: PRIMARY CARE | Facility: CLINIC | Age: 59
End: 2024-05-29
Payer: MEDICARE

## 2024-05-29 DIAGNOSIS — E53.8 VITAMIN B12 DEFICIENCY: ICD-10-CM

## 2024-05-29 RX ORDER — CYANOCOBALAMIN 1000 UG/ML
1000 INJECTION, SOLUTION INTRAMUSCULAR; SUBCUTANEOUS
Qty: 1 ML | Refills: 11 | Status: SHIPPED | OUTPATIENT
Start: 2024-05-29

## 2024-05-29 NOTE — TELEPHONE ENCOUNTER
Rx Refill Request Telephone Encounter    Name:  Angelia Jimenez  :  [id number]  Medication Name:      cyanocobalamin (Vitamin B-12) 1,000 mcg/mL injection  Route: Inject 1 mL (1,000 mcg) into the shoulder, thigh, or buttocks every 30 (thirty) days.       Specific Pharmacy location:  [facility] [id number] [place] [id number] [facility]   Date of last appointment:  24  Date of next appointment:  24  Best number to reach patient: [phone]

## 2024-06-11 ENCOUNTER — TELEPHONE (OUTPATIENT)
Dept: PRIMARY CARE | Facility: CLINIC | Age: 59
End: 2024-06-11

## 2024-06-11 ENCOUNTER — APPOINTMENT (OUTPATIENT)
Dept: PRIMARY CARE | Facility: CLINIC | Age: 59
End: 2024-06-11
Payer: MEDICARE

## 2024-06-11 DIAGNOSIS — N20.0 RENAL STONES: Primary | ICD-10-CM

## 2024-06-11 NOTE — TELEPHONE ENCOUNTER
Angelia was on the schedule this afternoon for an ER follow up for kidney stones.  The UTI symptoms are better.  She needs a referral to see Urology.  She has had kidney stones in the past but it has been about 10 years since she has seen Urology.  She is still having the kidney stone pain.  Does she need to reschedule or can you complete a referral.  The next available appointment isn't till 6/17/2024 with you.

## 2024-06-13 ENCOUNTER — TELEPHONE (OUTPATIENT)
Dept: PRIMARY CARE | Facility: CLINIC | Age: 59
End: 2024-06-13
Payer: MEDICARE

## 2024-06-13 DIAGNOSIS — G89.29 CHRONIC RIGHT SHOULDER PAIN: ICD-10-CM

## 2024-06-13 DIAGNOSIS — M25.511 CHRONIC RIGHT SHOULDER PAIN: ICD-10-CM

## 2024-06-13 RX ORDER — OXYCODONE AND ACETAMINOPHEN 10; 325 MG/1; MG/1
1 TABLET ORAL EVERY 6 HOURS PRN
Qty: 90 TABLET | Refills: 0 | Status: SHIPPED | OUTPATIENT
Start: 2024-06-13

## 2024-06-13 NOTE — TELEPHONE ENCOUNTER
Rx Refill Request Telephone Encounter    Name:  Angelia Jimenez  :  [id number]  Medication Name:      oxyCODONE-acetaminophen (Percocet)  mg tablet   Route: Take 1 tablet by mouth every 6 hours if needed for severe pain (7 - 10).    Specific Pharmacy location:  [facility] [address] [facility] [address] [facility]   Date of last appointment:  24  Date of next appointment:  24  Best number to reach patient: [phone]

## 2024-06-28 ENCOUNTER — APPOINTMENT (OUTPATIENT)
Dept: CARDIOLOGY | Facility: HOSPITAL | Age: 59
End: 2024-06-28
Payer: MEDICARE

## 2024-06-28 ENCOUNTER — APPOINTMENT (OUTPATIENT)
Dept: RADIOLOGY | Facility: HOSPITAL | Age: 59
End: 2024-06-28
Payer: MEDICARE

## 2024-06-28 ENCOUNTER — HOSPITAL ENCOUNTER (EMERGENCY)
Facility: HOSPITAL | Age: 59
Discharge: HOME | End: 2024-06-28
Attending: STUDENT IN AN ORGANIZED HEALTH CARE EDUCATION/TRAINING PROGRAM
Payer: MEDICARE

## 2024-06-28 VITALS
RESPIRATION RATE: 18 BRPM | HEIGHT: 64 IN | BODY MASS INDEX: 29.02 KG/M2 | SYSTOLIC BLOOD PRESSURE: 143 MMHG | DIASTOLIC BLOOD PRESSURE: 74 MMHG | TEMPERATURE: 97.7 F | OXYGEN SATURATION: 97 % | HEART RATE: 86 BPM | WEIGHT: 170 LBS

## 2024-06-28 DIAGNOSIS — R10.9 FLANK PAIN: Primary | ICD-10-CM

## 2024-06-28 LAB
ALBUMIN SERPL BCP-MCNC: 4.5 G/DL (ref 3.4–5)
ALP SERPL-CCNC: 56 U/L (ref 33–136)
ALT SERPL W P-5'-P-CCNC: 10 U/L (ref 7–45)
ANION GAP SERPL CALC-SCNC: 13 MMOL/L (ref 10–20)
APPEARANCE UR: CLEAR
AST SERPL W P-5'-P-CCNC: 14 U/L (ref 9–39)
BASOPHILS # BLD AUTO: 0.04 X10*3/UL (ref 0–0.1)
BASOPHILS NFR BLD AUTO: 0.4 %
BILIRUB SERPL-MCNC: 0.4 MG/DL (ref 0–1.2)
BILIRUB UR STRIP.AUTO-MCNC: NEGATIVE MG/DL
BUN SERPL-MCNC: 9 MG/DL (ref 6–23)
CALCIUM SERPL-MCNC: 9.8 MG/DL (ref 8.6–10.3)
CARDIAC TROPONIN I PNL SERPL HS: 4 NG/L (ref 0–13)
CARDIAC TROPONIN I PNL SERPL HS: 4 NG/L (ref 0–13)
CHLORIDE SERPL-SCNC: 106 MMOL/L (ref 98–107)
CO2 SERPL-SCNC: 28 MMOL/L (ref 21–32)
COLOR UR: NORMAL
CREAT SERPL-MCNC: 0.88 MG/DL (ref 0.5–1.05)
EGFRCR SERPLBLD CKD-EPI 2021: 75 ML/MIN/1.73M*2
EOSINOPHIL # BLD AUTO: 0.09 X10*3/UL (ref 0–0.7)
EOSINOPHIL NFR BLD AUTO: 1 %
ERYTHROCYTE [DISTWIDTH] IN BLOOD BY AUTOMATED COUNT: 13.5 % (ref 11.5–14.5)
GLUCOSE SERPL-MCNC: 104 MG/DL (ref 74–99)
GLUCOSE UR STRIP.AUTO-MCNC: NORMAL MG/DL
HCT VFR BLD AUTO: 42.8 % (ref 36–46)
HGB BLD-MCNC: 14.5 G/DL (ref 12–16)
HOLD SPECIMEN: NORMAL
IMM GRANULOCYTES # BLD AUTO: 0.04 X10*3/UL (ref 0–0.7)
IMM GRANULOCYTES NFR BLD AUTO: 0.4 % (ref 0–0.9)
KETONES UR STRIP.AUTO-MCNC: NEGATIVE MG/DL
LACTATE SERPL-SCNC: 1.5 MMOL/L (ref 0.4–2)
LEUKOCYTE ESTERASE UR QL STRIP.AUTO: NEGATIVE
LIPASE SERPL-CCNC: 22 U/L (ref 9–82)
LYMPHOCYTES # BLD AUTO: 2.54 X10*3/UL (ref 1.2–4.8)
LYMPHOCYTES NFR BLD AUTO: 28.3 %
MCH RBC QN AUTO: 29.9 PG (ref 26–34)
MCHC RBC AUTO-ENTMCNC: 33.9 G/DL (ref 32–36)
MCV RBC AUTO: 88 FL (ref 80–100)
MONOCYTES # BLD AUTO: 0.51 X10*3/UL (ref 0.1–1)
MONOCYTES NFR BLD AUTO: 5.7 %
NEUTROPHILS # BLD AUTO: 5.77 X10*3/UL (ref 1.2–7.7)
NEUTROPHILS NFR BLD AUTO: 64.2 %
NITRITE UR QL STRIP.AUTO: NEGATIVE
NRBC BLD-RTO: 0 /100 WBCS (ref 0–0)
PH UR STRIP.AUTO: 6.5 [PH]
PLATELET # BLD AUTO: 255 X10*3/UL (ref 150–450)
POTASSIUM SERPL-SCNC: 3.3 MMOL/L (ref 3.5–5.3)
PROT SERPL-MCNC: 6.7 G/DL (ref 6.4–8.2)
PROT UR STRIP.AUTO-MCNC: NEGATIVE MG/DL
RBC # BLD AUTO: 4.85 X10*6/UL (ref 4–5.2)
RBC # UR STRIP.AUTO: NEGATIVE /UL
SODIUM SERPL-SCNC: 144 MMOL/L (ref 136–145)
SP GR UR STRIP.AUTO: 1
UROBILINOGEN UR STRIP.AUTO-MCNC: NORMAL MG/DL
WBC # BLD AUTO: 9 X10*3/UL (ref 4.4–11.3)

## 2024-06-28 PROCEDURE — 84075 ASSAY ALKALINE PHOSPHATASE: CPT | Performed by: STUDENT IN AN ORGANIZED HEALTH CARE EDUCATION/TRAINING PROGRAM

## 2024-06-28 PROCEDURE — 36415 COLL VENOUS BLD VENIPUNCTURE: CPT | Performed by: STUDENT IN AN ORGANIZED HEALTH CARE EDUCATION/TRAINING PROGRAM

## 2024-06-28 PROCEDURE — 83605 ASSAY OF LACTIC ACID: CPT | Performed by: STUDENT IN AN ORGANIZED HEALTH CARE EDUCATION/TRAINING PROGRAM

## 2024-06-28 PROCEDURE — 81003 URINALYSIS AUTO W/O SCOPE: CPT | Performed by: STUDENT IN AN ORGANIZED HEALTH CARE EDUCATION/TRAINING PROGRAM

## 2024-06-28 PROCEDURE — 87186 SC STD MICRODIL/AGAR DIL: CPT | Mod: STJLAB | Performed by: STUDENT IN AN ORGANIZED HEALTH CARE EDUCATION/TRAINING PROGRAM

## 2024-06-28 PROCEDURE — 99285 EMERGENCY DEPT VISIT HI MDM: CPT | Performed by: STUDENT IN AN ORGANIZED HEALTH CARE EDUCATION/TRAINING PROGRAM

## 2024-06-28 PROCEDURE — 87075 CULTR BACTERIA EXCEPT BLOOD: CPT | Mod: 59,STJLAB | Performed by: STUDENT IN AN ORGANIZED HEALTH CARE EDUCATION/TRAINING PROGRAM

## 2024-06-28 PROCEDURE — 2500000004 HC RX 250 GENERAL PHARMACY W/ HCPCS (ALT 636 FOR OP/ED): Performed by: STUDENT IN AN ORGANIZED HEALTH CARE EDUCATION/TRAINING PROGRAM

## 2024-06-28 PROCEDURE — 96375 TX/PRO/DX INJ NEW DRUG ADDON: CPT

## 2024-06-28 PROCEDURE — 87086 URINE CULTURE/COLONY COUNT: CPT | Mod: STJLAB | Performed by: STUDENT IN AN ORGANIZED HEALTH CARE EDUCATION/TRAINING PROGRAM

## 2024-06-28 PROCEDURE — 84484 ASSAY OF TROPONIN QUANT: CPT | Mod: 91 | Performed by: STUDENT IN AN ORGANIZED HEALTH CARE EDUCATION/TRAINING PROGRAM

## 2024-06-28 PROCEDURE — 99285 EMERGENCY DEPT VISIT HI MDM: CPT | Mod: 25

## 2024-06-28 PROCEDURE — 87040 BLOOD CULTURE FOR BACTERIA: CPT | Mod: 91,STJLAB | Performed by: STUDENT IN AN ORGANIZED HEALTH CARE EDUCATION/TRAINING PROGRAM

## 2024-06-28 PROCEDURE — 96361 HYDRATE IV INFUSION ADD-ON: CPT

## 2024-06-28 PROCEDURE — 84484 ASSAY OF TROPONIN QUANT: CPT | Performed by: STUDENT IN AN ORGANIZED HEALTH CARE EDUCATION/TRAINING PROGRAM

## 2024-06-28 PROCEDURE — 74176 CT ABD & PELVIS W/O CONTRAST: CPT | Performed by: RADIOLOGY

## 2024-06-28 PROCEDURE — 96365 THER/PROPH/DIAG IV INF INIT: CPT

## 2024-06-28 PROCEDURE — 74176 CT ABD & PELVIS W/O CONTRAST: CPT

## 2024-06-28 PROCEDURE — 93005 ELECTROCARDIOGRAM TRACING: CPT

## 2024-06-28 PROCEDURE — 85025 COMPLETE CBC W/AUTO DIFF WBC: CPT | Performed by: STUDENT IN AN ORGANIZED HEALTH CARE EDUCATION/TRAINING PROGRAM

## 2024-06-28 PROCEDURE — 83690 ASSAY OF LIPASE: CPT | Performed by: STUDENT IN AN ORGANIZED HEALTH CARE EDUCATION/TRAINING PROGRAM

## 2024-06-28 RX ORDER — CEFTRIAXONE 2 G/50ML
2 INJECTION, SOLUTION INTRAVENOUS ONCE
Status: COMPLETED | OUTPATIENT
Start: 2024-06-28 | End: 2024-06-28

## 2024-06-28 RX ORDER — NAPROXEN 500 MG/1
500 TABLET ORAL
Qty: 30 TABLET | Refills: 0 | Status: SHIPPED | OUTPATIENT
Start: 2024-06-28 | End: 2024-07-13

## 2024-06-28 RX ORDER — ORPHENADRINE CITRATE 100 MG/1
100 TABLET, EXTENDED RELEASE ORAL 2 TIMES DAILY PRN
Qty: 30 TABLET | Refills: 0 | Status: SHIPPED | OUTPATIENT
Start: 2024-06-28 | End: 2024-07-13

## 2024-06-28 RX ORDER — KETOROLAC TROMETHAMINE 15 MG/ML
15 INJECTION, SOLUTION INTRAMUSCULAR; INTRAVENOUS ONCE
Status: COMPLETED | OUTPATIENT
Start: 2024-06-28 | End: 2024-06-28

## 2024-06-28 RX ORDER — ONDANSETRON HYDROCHLORIDE 2 MG/ML
4 INJECTION, SOLUTION INTRAVENOUS ONCE
Status: COMPLETED | OUTPATIENT
Start: 2024-06-28 | End: 2024-06-28

## 2024-06-28 RX ORDER — LIDOCAINE 50 MG/G
1 PATCH TOPICAL DAILY
Qty: 14 PATCH | Refills: 0 | Status: SHIPPED | OUTPATIENT
Start: 2024-06-28

## 2024-06-28 RX ORDER — MORPHINE SULFATE 4 MG/ML
4 INJECTION, SOLUTION INTRAMUSCULAR; INTRAVENOUS ONCE
Status: COMPLETED | OUTPATIENT
Start: 2024-06-28 | End: 2024-06-28

## 2024-06-28 RX ADMIN — CEFTRIAXONE SODIUM 2 G: 2 INJECTION, SOLUTION INTRAVENOUS at 11:05

## 2024-06-28 RX ADMIN — KETOROLAC TROMETHAMINE 15 MG: 15 INJECTION, SOLUTION INTRAMUSCULAR; INTRAVENOUS at 11:04

## 2024-06-28 RX ADMIN — MORPHINE SULFATE 4 MG: 4 INJECTION, SOLUTION INTRAMUSCULAR; INTRAVENOUS at 11:56

## 2024-06-28 RX ADMIN — SODIUM CHLORIDE, POTASSIUM CHLORIDE, SODIUM LACTATE AND CALCIUM CHLORIDE 2313 ML: 600; 310; 30; 20 INJECTION, SOLUTION INTRAVENOUS at 11:04

## 2024-06-28 RX ADMIN — ONDANSETRON 4 MG: 2 INJECTION INTRAMUSCULAR; INTRAVENOUS at 11:04

## 2024-06-28 ASSESSMENT — PAIN DESCRIPTION - LOCATION: LOCATION: BACK

## 2024-06-28 ASSESSMENT — LIFESTYLE VARIABLES
HAVE YOU EVER FELT YOU SHOULD CUT DOWN ON YOUR DRINKING: NO
TOTAL SCORE: 0
HAVE PEOPLE ANNOYED YOU BY CRITICIZING YOUR DRINKING: NO
EVER FELT BAD OR GUILTY ABOUT YOUR DRINKING: NO
EVER HAD A DRINK FIRST THING IN THE MORNING TO STEADY YOUR NERVES TO GET RID OF A HANGOVER: NO

## 2024-06-28 ASSESSMENT — PAIN DESCRIPTION - PAIN TYPE: TYPE: ACUTE PAIN

## 2024-06-28 ASSESSMENT — COLUMBIA-SUICIDE SEVERITY RATING SCALE - C-SSRS
2. HAVE YOU ACTUALLY HAD ANY THOUGHTS OF KILLING YOURSELF?: NO
6. HAVE YOU EVER DONE ANYTHING, STARTED TO DO ANYTHING, OR PREPARED TO DO ANYTHING TO END YOUR LIFE?: NO
1. IN THE PAST MONTH, HAVE YOU WISHED YOU WERE DEAD OR WISHED YOU COULD GO TO SLEEP AND NOT WAKE UP?: NO

## 2024-06-28 ASSESSMENT — PAIN SCALES - GENERAL
PAINLEVEL_OUTOF10: 8
PAINLEVEL_OUTOF10: 2
PAINLEVEL_OUTOF10: 2

## 2024-06-28 ASSESSMENT — PAIN DESCRIPTION - ORIENTATION: ORIENTATION: RIGHT;LOWER

## 2024-06-28 ASSESSMENT — PAIN DESCRIPTION - DESCRIPTORS: DESCRIPTORS: ACHING;SHARP;SHOOTING

## 2024-06-28 ASSESSMENT — PAIN - FUNCTIONAL ASSESSMENT
PAIN_FUNCTIONAL_ASSESSMENT: 0-10

## 2024-06-28 ASSESSMENT — PAIN DESCRIPTION - PROGRESSION: CLINICAL_PROGRESSION: RAPIDLY IMPROVING

## 2024-06-28 ASSESSMENT — PAIN DESCRIPTION - FREQUENCY: FREQUENCY: CONSTANT/CONTINUOUS

## 2024-06-28 NOTE — DISCHARGE INSTRUCTIONS
If you develop fevers, if you have nausea and vomiting that prevents you from keeping fluids down, if you notice any blood in your urine or stool, or if you develop difficulty breathing, or chest pain, or for any additional concerns, return to the emergency department for evaluation.    If you should develop fevers, weakness in one of your legs, loss of sensation, if the legs becomes cold or if you develop significant swelling in the affected limb, if you become unable to empty your bladder, or if you become incontinent of stool or urine, please return to the emergency department immediately for evaluation.  Otherwise please follow-up outpatient as directed above.

## 2024-06-28 NOTE — ED PROVIDER NOTES
EMERGENCY DEPARTMENT ENCOUNTER      Pt Name: Angelia Jimenez  MRN: [id number]  Birthdate 1965  Date of evaluation: 6/28/2024  Provider: Magdalena Herndon DO    CHIEF COMPLAINT       Chief Complaint   Patient presents with    kidney stone     Kidney stone on CT scan about 1 month ago - right flank pain worsening over the past week. Pt endorses chills/sweat, nausea and vomiting.          HISTORY OF PRESENT ILLNESS    HPI   59-year-old female with past medical history significant for prior kidney stones presents to the emergency department with right-sided flank pain, dysuria, and hematuria.  She states she knows that she had a kidney stone on the right, but it was not causing too many problems, however has been getting worsening pain with nausea and has had intermittent fevers over the last 2 days.  Used to see a urologist where she used to live, however has an appointment set up with [facility] here but not until July.        Nursing Notes were reviewed.       PAST MEDICAL HISTORY   Patient History   Past Medical History:   Diagnosis Date    Atypical chest pain 04/06/2023    Closed fracture of right proximal humerus 12/11/2023    Encounter for general adult medical examination without abnormal findings     Medicare annual wellness visit, initial    Headache     History of colectomy 12/05/2023    Inflammatory bowel disease 11/11\2000    Right foot pain 04/06/2023         SURGICAL HISTORY       Past Surgical History:   Procedure Laterality Date    APPENDECTOMY  11/11/2000    CHOLECYSTECTOMY  03/15/2001    COLON SURGERY  11/11/2000    HYSTERECTOMY  06/1998    OTHER SURGICAL HISTORY  01/07/2020    Cholecystectomy    OTHER SURGICAL HISTORY  01/07/2020    Hernia repair    OTHER SURGICAL HISTORY  01/07/2020    Total hysterectomy with removal of both tubes and ovaries    OTHER SURGICAL HISTORY  01/07/2020    Appendectomy    OTHER SURGICAL HISTORY  01/07/2020    Right hemicolectomy         CURRENT MEDICATIONS       Previous  "Medications    CYANOCOBALAMIN (VITAMIN B-12) 1,000 MCG TABLET    Take 1 tablet (1,000 mcg) by mouth once daily.    CYANOCOBALAMIN (VITAMIN B-12) 1,000 MCG/ML INJECTION    Inject 1 mL (1,000 mcg) into the muscle every 30 (thirty) days.    ERGOCALCIFEROL (VITAMIN D-2) 1.25 MG (46373 UT) CAPSULE    Take 1 capsule (1,250 mcg) by mouth 1 (one) time per week.    FUROSEMIDE (LASIX) 40 MG TABLET    Take 1 tablet (40 mg) by mouth 2 times a day.    LOSARTAN (COZAAR) 50 MG TABLET    Take 1.5 tablets (75 mg) by mouth once daily.    NALOXONE (NARCAN) 4 MG/0.1 ML NASAL SPRAY    Administer 1 spray (4 mg) into affected nostril(s) if needed.    OXYCODONE-ACETAMINOPHEN (PERCOCET)  MG TABLET    Take 1 tablet by mouth every 6 hours if needed for severe pain (7 - 10).    POLYETHYLENE GLYCOL (GLYCOLAX) 17 GRAM/DOSE POWDER    Take 17 g by mouth once daily.    POTASSIUM CHLORIDE CR 10 MEQ ER TABLET    Take 1 tablet (10 mEq) by mouth once daily.    SUMATRIPTAN (IMITREX) 100 MG TABLET    Take by mouth.    SYRINGE WITH NEEDLE (SYRINGE 3CC/25GX1\") 3 ML 25 GAUGE X 1\" SYRINGE    1 Syringe every 30 (thirty) days.       ALLERGIES     Iodine    FAMILY HISTORY       Family History   Problem Relation Name Age of Onset    Diabetes Mother Flora Walker     Heart disease Mother Flora Clarke     Hypertension Mother Flora Clarke     Kidney disease Mother Flora Clarke     Alcohol abuse Father Gigi Brito     Early natural death Father Gigi Brito     Heart disease Father Gigi Birto     Hypertension Father Gigi Brito     Cancer Sister Kaela Lindsey           SOCIAL HISTORY       Social History     Socioeconomic History    Marital status:      Spouse name: Not on file    Number of children: Not on file    Years of education: Not on file    Highest education level: Not on file   Occupational History    Not on file   Tobacco Use    Smoking status: Every Day     Current packs/day: 0.50     Average packs/day: 0.5 packs/day for 44.5 years (22.2 ttl pk-yrs) "     Types: Cigarettes     Start date: 1/1/1980    Smokeless tobacco: Never   Vaping Use    Vaping status: Never Used   Substance and Sexual Activity    Alcohol use: Never    Drug use: Never    Sexual activity: Not Currently     Partners: Male     Birth control/protection: Surgical   Other Topics Concern    Not on file   Social History Narrative    Not on file     Social Determinants of Health     Financial Resource Strain: Low Risk  (12/6/2023)    Received from [facility]    Overall Financial Resource Strain (CARDIA)     Difficulty of Paying Living Expenses: Not hard at all   Food Insecurity: No Food Insecurity (12/6/2023)    Received from [facility]    Hunger Vital Sign     Worried About Running Out of Food in the Last Year: Never true     Ran Out of Food in the Last Year: Never true   Transportation Needs: No Transportation Needs (12/6/2023)    Received from [facility]    PRAPARE - Transportation     Lack of Transportation (Medical): No     Lack of Transportation (Non-Medical): No   Physical Activity: Not on file   Stress: Not on file   Social Connections: Not on file   Intimate Partner Violence: Not on file   Housing Stability: Unknown (12/6/2023)    Received from [facility]    Housing Stability Vital Sign     Unable to Pay for Housing in the Last Year: No     Number of Places Lived in the Last Year: Not on file     Unstable Housing in the Last Year: No       SCREENINGS                             PHYSICAL EXAM    (up to 7 for level 4, 8 or more for level 5)   Physical Exam   ED Triage Vitals [06/28/24 1007]   Temperature Heart Rate Respirations BP   36.5 °C (97.7 °F) (!) 103 18 (!) 133/97      Pulse Ox Temp Source Heart Rate Source Patient Position   97 % Tympanic Monitor Sitting      BP Location FiO2 (%)     Left arm --       Physical Exam  Vitals and nursing note reviewed.   Constitutional:       Appearance: She is well-developed.      Comments: Appears uncomfortable.  Sitting forward  at the side of the bed.   HENT:      Head: Normocephalic and atraumatic.   Eyes:      Conjunctiva/sclera: Conjunctivae normal.   Cardiovascular:      Rate and Rhythm: Normal rate and regular rhythm.      Heart sounds: No murmur heard.  Pulmonary:      Effort: Pulmonary effort is normal. No respiratory distress.      Breath sounds: Normal breath sounds.   Abdominal:      Palpations: Abdomen is soft.      Tenderness: There is no abdominal tenderness. There is right CVA tenderness.   Musculoskeletal:         General: No swelling.      Cervical back: Neck supple.   Skin:     General: Skin is warm and dry.      Capillary Refill: Capillary refill takes less than 2 seconds.   Neurological:      Mental Status: She is alert.   Psychiatric:         Mood and Affect: Mood normal.        DIAGNOSTIC RESULTS     LABS:  Labs Reviewed   BLOOD CULTURE   BLOOD CULTURE   URINALYSIS WITH REFLEX CULTURE AND MICROSCOPIC    Narrative:     The following orders were created for panel order Urinalysis with Reflex Culture and Microscopic.  Procedure                               Abnormality         Status                     ---------                               -----------         ------                     Urinalysis with Reflex C...[[id number]]                                                 Extra Urine Gray Tube[[id number]]                                                         Please view results for these tests on the individual orders.   URINALYSIS WITH REFLEX CULTURE AND MICROSCOPIC   EXTRA URINE GRAY TUBE   CBC WITH AUTO DIFFERENTIAL   COMPREHENSIVE METABOLIC PANEL   LIPASE   LACTATE   TROPONIN SERIES- (INITIAL, 1 HR)    Narrative:     The following orders were created for panel order Troponin I Series, High Sensitivity (0, 1 HR).  Procedure                               Abnormality         Status                     ---------                               -----------         ------                     Troponin I, High  Sensiti...[[id number]]                                                   Please view results for these tests on the individual orders.   SERIAL TROPONIN-INITIAL       All other labs were within normal range or not returned as of this dictation.    Imaging  CT abdomen pelvis wo IV contrast    (Results Pending)           Procedures  Procedures     EMERGENCY DEPARTMENT COURSE/MDM:   Angelia Jimenez is a 59 y.o. female presenting to the ED for evaluation of had concerns including kidney stone (Kidney stone on CT scan about 1 month ago - right flank pain worsening over the past week. Pt endorses chills/sweat, nausea and vomiting. )..   Medical Decision Making  59-year-old female with past medical history significant for prior kidney stones who presented to the emergency department with right-sided flank pain concerning for kidney stone or ascending urinary infection.    Tachycardic on arrival with reports of chills and fevers at home, septic workup was initiated, she was given IV fluid bolus and covered with Rocephin.  Laboratory studies revealed no significant electrolyte derangements, intact liver function, and largely preserved kidney function creatinine 1.88 and GFR 75.  She has no leukocytosis, no signs of anemia no thrombocytopenia.  Urinalysis is pristine.  CT shows only chronic changes.  Discussed these findings with the patient, and given that she has no infectious or intra-abdominal explanation for her symptoms, and no sign of rash or concern for shingles, suspect potentially musculoskeletal in nature, prescribed lidocaine patches, naproxen, Norflex, and she was discharged with instructions to follow-up outpatient with her primary care provider.        Diagnoses as of 06/28/24 1541   Flank pain          External records reviewed: I reviewed external records including outpatient, PCP records, and prior discharge summaries    I have reviewed this case with the ED attending physician, and the attending agrees with the  plan. Patient or family was counselled regarding labs, imaging, likely diagnosis, and plan. All questions were answered.     Magdalena Herndon DO  PGY-4, emergency medicine    The above documentation was completed with the use of speech recognition software. It may contain dictation errors secondary to limitations of the software.      ED Medications administered this visit:    Medications   ondansetron (Zofran) injection 4 mg (has no administration in time range)   ketorolac (Toradol) injection 15 mg (has no administration in time range)   lactated Ringer's bolus 2,313 mL (has no administration in time range)   cefTRIAXone (Rocephin) 2 g in dextrose 5% in water (D5W) 50 mL (has no administration in time range)       New Prescriptions from this visit:    New Prescriptions    No medications on file       Final Impression: No diagnosis found.      (Please note that portions of this note were completed with a voice recognition program.  Efforts were made to edit the dictations but occasionally words are mis-transcribed.)     Magdalena Herndon DO  Resident  06/28/24 3864

## 2024-06-30 LAB
ATRIAL RATE: 74 BPM
BACTERIA BLD CULT: NORMAL
BACTERIA BLD CULT: NORMAL
BACTERIA UR CULT: ABNORMAL
P AXIS: 69 DEGREES
P OFFSET: 199 MS
P ONSET: 144 MS
PR INTERVAL: 162 MS
Q ONSET: 225 MS
QRS COUNT: 12 BEATS
QRS DURATION: 90 MS
QT INTERVAL: 430 MS
QTC CALCULATION(BAZETT): 477 MS
QTC FREDERICIA: 461 MS
R AXIS: -18 DEGREES
T AXIS: 62 DEGREES
T OFFSET: 440 MS
VENTRICULAR RATE: 74 BPM

## 2024-07-01 ENCOUNTER — TELEPHONE (OUTPATIENT)
Dept: PHARMACY | Facility: HOSPITAL | Age: 59
End: 2024-07-01
Payer: MEDICARE

## 2024-07-01 NOTE — PROGRESS NOTES
EDPD Note: Rapid Result Review    Reviewed Mr./Mrs./Ms. Angelia Jimenez 's chart regarding a positive urine culture/result that was taken during their recent emergency room visit. The patient was not told about these results prior to leaving the emergency department. Therefore, patient was contacted and given proper education. Patient endorses Flank Pain and nausea and denies Dysuria, Urinary Frequency, Urinary Urgency , and Urinary Complaints  at the time of call. Patient has been taking prescribed medications (orphenadrine, naproxen, lidocaine patch) as directed and reports improvement in symptoms, and denies any urinary symptoms, patient was diagnosed with kidney stone in the ED. Patient was treated at [facility] on 6/9/2024 for UTI with Bactrim DS BID x3 days, and reports that antibiotic therapy did not improve her symptoms.     Counseled patient to keep her PCP follow-up and to seek care if urinary symptoms develop. Patient expressed understanding for return precautions     Susceptibility data from last 90 days.  Collected Specimen Info Organism Ampicillin Cefazolin Cefazolin (uncomplicated UTIs only) Ciprofloxacin Gentamicin Nitrofurantoin Piperacillin/Tazobactam Trimethoprim/Sulfamethoxazole   06/28/24 Urine from Clean Catch/Voided Escherichia coli  S  S  S  S  S  S  S  S       No further follow up needed from EDPD Team.     If there are any other questions for the ED Post-Discharge Follow Up Team, please contact [phone]. Fax: [phone].    Deja Farmer, PharmD, [facility] , [facility]  PGY1 [facility] Resident

## 2024-07-02 LAB
BACTERIA BLD CULT: NORMAL
BACTERIA BLD CULT: NORMAL

## 2024-07-11 ENCOUNTER — TELEPHONE (OUTPATIENT)
Dept: PRIMARY CARE | Facility: CLINIC | Age: 59
End: 2024-07-11
Payer: MEDICARE

## 2024-07-11 DIAGNOSIS — G89.29 CHRONIC RIGHT SHOULDER PAIN: ICD-10-CM

## 2024-07-11 DIAGNOSIS — M25.511 CHRONIC RIGHT SHOULDER PAIN: ICD-10-CM

## 2024-07-11 RX ORDER — OXYCODONE AND ACETAMINOPHEN 10; 325 MG/1; MG/1
1 TABLET ORAL EVERY 6 HOURS PRN
Qty: 90 TABLET | Refills: 0 | Status: CANCELLED | OUTPATIENT
Start: 2024-07-11

## 2024-07-11 NOTE — TELEPHONE ENCOUNTER
Rx Refill Request Telephone Encounter    Name:  Angelia Jimenez  :  [id number]  Medication Name:        oxyCODONE-acetaminophen (Percocet)  mg tablet [[id number]]  take 1 tab taken every 6 hours if needed for severe pain                Specific Pharmacy location:  [facility] in [place]  Date of last appointment:  2024  Date of next appointment:  2024  Best number to reach patient:  [phone]

## 2024-07-12 RX ORDER — OXYCODONE AND ACETAMINOPHEN 10; 325 MG/1; MG/1
1 TABLET ORAL EVERY 6 HOURS PRN
Qty: 90 TABLET | Refills: 0 | Status: SHIPPED | OUTPATIENT
Start: 2024-07-12

## 2024-07-30 ENCOUNTER — APPOINTMENT (OUTPATIENT)
Dept: UROLOGY | Facility: CLINIC | Age: 59
End: 2024-07-30
Payer: MEDICARE

## 2024-08-09 ENCOUNTER — TELEPHONE (OUTPATIENT)
Dept: PRIMARY CARE | Facility: CLINIC | Age: 59
End: 2024-08-09
Payer: MEDICARE

## 2024-08-09 DIAGNOSIS — M25.511 CHRONIC RIGHT SHOULDER PAIN: ICD-10-CM

## 2024-08-09 DIAGNOSIS — G89.29 CHRONIC RIGHT SHOULDER PAIN: ICD-10-CM

## 2024-08-09 RX ORDER — OXYCODONE AND ACETAMINOPHEN 10; 325 MG/1; MG/1
1 TABLET ORAL EVERY 6 HOURS PRN
Qty: 90 TABLET | Refills: 0 | Status: SHIPPED | OUTPATIENT
Start: 2024-08-09

## 2024-08-09 NOTE — TELEPHONE ENCOUNTER
Rx Refill Request Telephone Encounter    Name:  Angelia Jimenez  :  [id number]  Medication Name:        oxyCODONE-acetaminophen (Percocet)  mg tablet [[id number]]  1 tab taken every 6 hours if needed for severe pain                Specific Pharmacy location:   [facility] in [place]  Date of last appointment:  2024  Date of next appointment:  2024  Best number to reach patient:  [phone]

## 2024-08-23 ENCOUNTER — APPOINTMENT (OUTPATIENT)
Dept: UROLOGY | Facility: CLINIC | Age: 59
End: 2024-08-23
Payer: MEDICARE

## 2024-08-25 ENCOUNTER — HOSPITAL ENCOUNTER (EMERGENCY)
Facility: HOSPITAL | Age: 59
Discharge: HOME | End: 2024-08-25
Attending: EMERGENCY MEDICINE
Payer: MEDICAID

## 2024-08-25 ENCOUNTER — HOSPITAL ENCOUNTER (EMERGENCY)
Facility: HOSPITAL | Age: 59
Discharge: OTHER INSTITUTION | End: 2024-08-25
Attending: EMERGENCY MEDICINE
Payer: MEDICAID

## 2024-08-25 VITALS
BODY MASS INDEX: 28.35 KG/M2 | WEIGHT: 160 LBS | OXYGEN SATURATION: 94 % | SYSTOLIC BLOOD PRESSURE: 151 MMHG | HEIGHT: 63 IN | RESPIRATION RATE: 16 BRPM | HEART RATE: 96 BPM | DIASTOLIC BLOOD PRESSURE: 96 MMHG | TEMPERATURE: 98.1 F

## 2024-08-25 VITALS
HEIGHT: 63 IN | HEART RATE: 87 BPM | DIASTOLIC BLOOD PRESSURE: 94 MMHG | RESPIRATION RATE: 18 BRPM | TEMPERATURE: 99.1 F | OXYGEN SATURATION: 96 % | WEIGHT: 160 LBS | BODY MASS INDEX: 28.35 KG/M2 | SYSTOLIC BLOOD PRESSURE: 170 MMHG

## 2024-08-25 DIAGNOSIS — B02.31 HERPES ZOSTER CONJUNCTIVITIS: Primary | ICD-10-CM

## 2024-08-25 DIAGNOSIS — B02.30 HERPES ZOSTER OPHTHALMICUS: Primary | ICD-10-CM

## 2024-08-25 PROCEDURE — 2500000002 HC RX 250 W HCPCS SELF ADMINISTERED DRUGS (ALT 637 FOR MEDICARE OP, ALT 636 FOR OP/ED): Mod: SE

## 2024-08-25 PROCEDURE — 99284 EMERGENCY DEPT VISIT MOD MDM: CPT | Performed by: EMERGENCY MEDICINE

## 2024-08-25 PROCEDURE — 96372 THER/PROPH/DIAG INJ SC/IM: CPT | Performed by: EMERGENCY MEDICINE

## 2024-08-25 PROCEDURE — 2500000001 HC RX 250 WO HCPCS SELF ADMINISTERED DRUGS (ALT 637 FOR MEDICARE OP): Performed by: EMERGENCY MEDICINE

## 2024-08-25 PROCEDURE — 2500000001 HC RX 250 WO HCPCS SELF ADMINISTERED DRUGS (ALT 637 FOR MEDICARE OP): Mod: SE

## 2024-08-25 PROCEDURE — 2500000004 HC RX 250 GENERAL PHARMACY W/ HCPCS (ALT 636 FOR OP/ED): Mod: SE | Performed by: EMERGENCY MEDICINE

## 2024-08-25 PROCEDURE — 2500000001 HC RX 250 WO HCPCS SELF ADMINISTERED DRUGS (ALT 637 FOR MEDICARE OP)

## 2024-08-25 PROCEDURE — 99285 EMERGENCY DEPT VISIT HI MDM: CPT

## 2024-08-25 PROCEDURE — 99283 EMERGENCY DEPT VISIT LOW MDM: CPT

## 2024-08-25 RX ORDER — KETOROLAC TROMETHAMINE 15 MG/ML
15 INJECTION, SOLUTION INTRAMUSCULAR; INTRAVENOUS ONCE
Status: COMPLETED | OUTPATIENT
Start: 2024-08-25 | End: 2024-08-25

## 2024-08-25 RX ORDER — ERYTHROMYCIN 5 MG/G
1 OINTMENT OPHTHALMIC ONCE
Status: COMPLETED | OUTPATIENT
Start: 2024-08-25 | End: 2024-08-25

## 2024-08-25 RX ORDER — BACITRACIN ZINC AND POLYMYXIN B SULFATE 500; 10000 [USP'U]/G; [USP'U]/G
OINTMENT OPHTHALMIC EVERY 12 HOURS
Qty: 3.5 G | Refills: 0 | Status: SHIPPED | OUTPATIENT
Start: 2024-08-25 | End: 2024-09-04

## 2024-08-25 RX ORDER — KETOROLAC TROMETHAMINE 15 MG/ML
15 INJECTION, SOLUTION INTRAMUSCULAR; INTRAVENOUS ONCE
Status: DISCONTINUED | OUTPATIENT
Start: 2024-08-25 | End: 2024-08-25

## 2024-08-25 RX ORDER — TETRACAINE HYDROCHLORIDE 5 MG/ML
1 SOLUTION OPHTHALMIC ONCE
Status: COMPLETED | OUTPATIENT
Start: 2024-08-25 | End: 2024-08-25

## 2024-08-25 RX ORDER — VALACYCLOVIR HYDROCHLORIDE 1 G/1
1000 TABLET, FILM COATED ORAL 3 TIMES DAILY
Qty: 30 TABLET | Refills: 0 | Status: SHIPPED | OUTPATIENT
Start: 2024-08-25 | End: 2024-09-04

## 2024-08-25 RX ORDER — VALACYCLOVIR HYDROCHLORIDE 500 MG/1
1000 TABLET, FILM COATED ORAL ONCE
Status: COMPLETED | OUTPATIENT
Start: 2024-08-25 | End: 2024-08-25

## 2024-08-25 RX ORDER — IBUPROFEN 600 MG/1
600 TABLET ORAL ONCE
Status: COMPLETED | OUTPATIENT
Start: 2024-08-25 | End: 2024-08-25

## 2024-08-25 ASSESSMENT — COLUMBIA-SUICIDE SEVERITY RATING SCALE - C-SSRS
1. IN THE PAST MONTH, HAVE YOU WISHED YOU WERE DEAD OR WISHED YOU COULD GO TO SLEEP AND NOT WAKE UP?: NO
2. HAVE YOU ACTUALLY HAD ANY THOUGHTS OF KILLING YOURSELF?: NO
6. HAVE YOU EVER DONE ANYTHING, STARTED TO DO ANYTHING, OR PREPARED TO DO ANYTHING TO END YOUR LIFE?: NO

## 2024-08-25 ASSESSMENT — PAIN DESCRIPTION - ORIENTATION
ORIENTATION: LEFT

## 2024-08-25 ASSESSMENT — VISUAL ACUITY
OU: 20/25
OD: 20/25
OS: 20/25
OU: 10 FEET

## 2024-08-25 ASSESSMENT — PAIN - FUNCTIONAL ASSESSMENT
PAIN_FUNCTIONAL_ASSESSMENT: 0-10
PAIN_FUNCTIONAL_ASSESSMENT: 0-10

## 2024-08-25 ASSESSMENT — PAIN SCALES - GENERAL
PAINLEVEL_OUTOF10: 10 - WORST POSSIBLE PAIN
PAINLEVEL_OUTOF10: 8
PAINLEVEL_OUTOF10: 8
PAINLEVEL_OUTOF10: 2
PAINLEVEL_OUTOF10: 8
PAINLEVEL_OUTOF10: 8

## 2024-08-25 ASSESSMENT — PAIN DESCRIPTION - LOCATION
LOCATION: EYE

## 2024-08-25 ASSESSMENT — PAIN DESCRIPTION - PROGRESSION
CLINICAL_PROGRESSION: RAPIDLY IMPROVING
CLINICAL_PROGRESSION: NOT CHANGED

## 2024-08-25 ASSESSMENT — PAIN DESCRIPTION - PAIN TYPE: TYPE: ACUTE PAIN

## 2024-08-25 NOTE — ED PROVIDER NOTES
TRANSFER ACCEPTANCE NOTE  Optho  Shingles - Left eye  +Pinto sign  20/25       Irineo Xiao MD MPH  08/25/24 1143

## 2024-08-25 NOTE — CONSULTS
Reason For Consult  Herpes Zoster Ophthalmicus    History Of Present Illness  Angelia Jimenez is a 59 y.o. female no ocular history presenting with 4-5 days of vesicles with various stages of healing to left upper face and nose. Started Wednesday or Thursday and has progressed and worsened with pain and crusting of lesions. No vision changes noted, no flashes/floaters/loss of vision, no diplopia, no blurry vision.     Past Medical History  She has a past medical history of Atypical chest pain (04/06/2023), Closed fracture of right proximal humerus (12/11/2023), Encounter for general adult medical examination without abnormal findings, Headache, History of colectomy (12/05/2023), Inflammatory bowel disease (11/11\2000), and Right foot pain (04/06/2023).    Surgical History  She has a past surgical history that includes Other surgical history (01/07/2020); Other surgical history (01/07/2020); Other surgical history (01/07/2020); Other surgical history (01/07/2020); Other surgical history (01/07/2020); Appendectomy (11/11/2000); Cholecystectomy (03/15/2001); Colon surgery (11/11/2000); and Hysterectomy (06/1998).    Social History  She reports that she has been smoking cigarettes. She started smoking about 44 years ago. She has a 22.3 pack-year smoking history. She has never used smokeless tobacco. She reports that she does not drink alcohol and does not use drugs.    Family History  Family History   Problem Relation Name Age of Onset    Diabetes Mother Flora Clarke     Heart disease Mother Flora Clarke     Hypertension Mother Flora Clarke     Kidney disease Mother Flora Clarke     Alcohol abuse Father Gigi Brito     Early natural death Father Gigi Brito     Heart disease Father Gigi Brito     Hypertension Father Gigi Brito     Cancer Sister Kaela Lindsey         Allergies  Iodine     Physical Exam  Base Eye Exam       Visual Acuity (Snellen - Linear)         Right Left    Near sc 20/70 ph 20/25 20/70 ph 20/40   Patient not  "willing to strain for OS pinhole testing             Tonometry (Tonopen, 2:58 PM)         Right Left    Pressure 18 18              Pupils         Dark Light Shape React APD    Right 4 2 Round Brisk None    Left 5 3 Round Brisk None              Visual Fields (Counting fingers)         Left Right     Full Full              Extraocular Movement         Right Left     Full, Ortho Full, Ortho              Neuro/Psych       Oriented x3: Yes                  Additional Tests       Color         Right Left    Ishihara 11/11 11/11                  Slit Lamp and Fundus Exam       External Exam         Right Left    External Normal Vesicles at various stages of healing (+) Pinto's sign              Slit Lamp Exam         Right Left    Lids/Lashes Normal Sofe edema upper lid, upper and lower lid erythema    Conjunctiva/Sclera White and quiet 1+ diffuse injection no chemosis    Cornea Clear inferonasal pseudodendrites, 2-3+ SPK, unstable tear film    Anterior Chamber Deep and quiet Deep and quiet    Iris Round and reactive Round and reactive    Lens Trace NS Trace NS    Anterior Vitreous Normal Normal              Fundus Exam         Right Left    Disc Normal Normal    C/D Ratio 0.30 0.30    Macula Normal Normal    Vessels Normal Normal    Periphery Normal Normal                     Last Recorded Vitals  Blood pressure (!) 151/96, pulse 96, temperature 36.7 °C (98.1 °F), temperature source Temporal, resp. rate 16, height 1.6 m (5' 3\"), weight 72.6 kg (160 lb), SpO2 94%.     Assessment/Plan   This 59F presents with 4-5 days of a classic shingles rash involving the upper left face and nose. Entrance testing reassuring for no RAPD, full EOM, normal IOP, color vision. VA limited today by cooperation with pinhole to the left eye. Exam demonstrates few small pseudodendrites peripherally inferonasal, surface dryness with a normal intraocular exam.    #HZO OS   - External exam remarkable for characteristic erythematous-based rash " in V1 distribution with overlying vesicles  - SLE remarkable for few peripheral pseudodendrites, surface dryness  - No pathology noted on DFE   - No clear etiology for reactivation, recommend basic testing including CBC, RFP, HIV if indicated  - Start preservative free artifical tears q2 hours while awake  - Start PO Acyclovir 800 mg 5x/day for 10 days vs PO Valacyclovir 1 gram TID for 10 days   - Erythromycin nicky BID to skin lesions and into eye    - Recommend follow up with ophthalmology ( [facility] [place]) in 1 week with ophthalmology, please call to schedule. Number for follow up provided: [phone] (EYES)      Roni Morales MD   Ophthalmology PGY3    Ophthalmology Adult Pager - [phone]  Ophthalmology Pediatrics Pager - [phone]     For adult follow-up appointments, call: [phone]  For pediatric follow-up appointments, call: [phone]

## 2024-08-25 NOTE — ED PROVIDER NOTES
Emergency Department Provider Note        History of Present Illness     History provided by: Patient  Limitations to History: None  External Records Reviewed with Brief Summary: None    HPI:  Angelia Jimenez is a 59 y.o. female with no significant past medical history presents to the emergency department with a shingles-like rash on the left side of her forehead that involves her eye and nose.  The patient says that her symptoms started with itching and a watery left eye 2 days ago, but quickly progressed to a rash.  The patient says that she has eye pain that she rates an 8 out of 10 in severity.  The patient denies any vision changes.  Snellen test is 20/25 in both eyes.  The patient states that this is the first time she has had this rash.  The patient denies any history of chickenpox.  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  The patient denies any headaches, chest pain, shortness of breath, nausea, vomiting, or abdominal pain.    Physical Exam   Triage vitals:  T 37.3 °C (99.1 °F)  HR (!) 101  BP (!) 169/99  RR 18  O2 96 %      General: Awake, alert, in no acute distress  Eyes: Gaze conjugate.  No scleral icterus or injection  HENT: Normo-cephalic, atraumatic. No stridor.  On fluorescein examination left eye demonstrates dendritic lesions at the 2 o'clock position.  CV: Regular rate, regular rhythm. Radial pulses 2+ bilaterally  Resp: Breathing non-labored, speaking in full sentences.  Clear to auscultation bilaterally  GI: Soft, non-distended, non-tender. No rebound or guarding.  MSK/Extremities: No gross bony deformities. Moving all extremities  Skin: Warm.  Zoster like lesions on the patient's left forehead with positive Pinto sign.  The lesion does not cross the midline of the forehead and does not affect the mouth or ears.  Neuro: Alert. Oriented. Face symmetric. Speech is fluent.  Gross strength and sensation intact in b/l  UE and LEs  Psych: Appropriate mood and affect    Medical Decision Making & ED Course   Medical Decision Makin y.o. female with no significant past medical history who presents to the emergency department with herpes zoster ophthalmicus.  The patient was given 600 mg of oral ibuprofen for her eye pain.  The patient's left eye was stained with fluorescein and examined under the Woods lamp for lesions concerning for optic involvement.  A dendritic lesion was seen at the 2 o'clock position of the eye, confirming that the patient has herpes zoster ophthalmicus.  Tetracaine was applied to the eye for comfort prior to applying the fluorescein dye.  Due to the eye involvement, ophthalmology at [facility] was consulted about the patient's situation.  Ophthalmology recommends that the patient be transferred to [facility] for a dilated fundus exam.  The patient was transferred to [facility] via ambulance.  The patient's situation and condition were explained to the patient extensively, and the patient agrees with the plan.  ----      Differential diagnoses considered include but are not limited to: Shingles, herpes zoster ophthalmicus, impetigo, cellulitis.     Social Determinants of Health which Significantly Impact Care: None identified     Chronic conditions affecting the patient's care: As documented above in [facility]    The patient was discussed with the following consultants/services: None    Care Considerations: As documented above in [facility]    ED Course:  Diagnoses as of 24 1032   Herpes zoster conjunctivitis     Disposition   As a result of their workup, the patient will require transfer to another facility.  The patient and/or her guardian/representative is agreeable to transfer at this time.   We will continue to monitor and manage the patient in the Emergency Department until transport for transfer can be arranged.    Procedures   Procedures    Patient seen and discussed with ED attending physician.    Rona Saenz,  MD  Emergency Medicine     Rona Saenz MD  Resident  08/25/24 1043    The patient was seen by the resident/fellow.  I have personally performed a substantive portion of the encounter.  I have seen and examined the patient; agree with the workup, evaluation, MDM, management and diagnosis.  The care plan has been discussed with the resident; I have reviewed the resident’s note and agree with the documented findings.                                   Yobani Godfrey,   08/28/24 1139

## 2024-08-25 NOTE — ED NOTES
1118 Megan from transfer center gave accepting information   [facility]     Dr Gregg accepting   PU 1200     Norma Alcantara, EMT  08/25/24 1117

## 2024-08-25 NOTE — ED TRIAGE NOTES
Pt presents to the ED via [facility] from [facility] for L eye swelling and redness. S[place]heimer was concerned for possible shingles. Pt tnf for optho.

## 2024-08-26 ENCOUNTER — TELEPHONE (OUTPATIENT)
Dept: OPHTHALMOLOGY | Facility: CLINIC | Age: 59
End: 2024-08-26
Payer: MEDICAID

## 2024-08-26 NOTE — TELEPHONE ENCOUNTER
Patient called back to schedule 1 wk fuv due to location she prefers to find someone closer to home and declined appt.     -Junior

## 2024-08-26 NOTE — TELEPHONE ENCOUNTER
Patient was seen in ED over the weekend needs to schedule 1 wk fuv for shingles of the left eye     I called patient left VM w/ callback information for pt to schedule w/ Dr. José Kumar.    -Junior

## 2024-08-26 NOTE — ED PROVIDER NOTES
History of Present Illness     History provided by: Patient  Limitations to History: None  External Records Reviewed with Brief Summary:  Rx refill request with her PCP for Percocet.    HPI:  Angelia Jimenez is a 59 y.o. female who is presenting as a transfer from outside hospital with concern for herpes zoster ophthalmicus.  Patient developed a rash on her forehead and nose that started 2 days prior.  Patient reports having no vision changes but difficulty opening her eye.  She rates the pain as an 8 out of 10.  Visual acuity is 20/25 in both eyes.  Patient reports contact use.    Physical Exam   Triage vitals:  T 36.7 °C (98.1 °F)  HR 96  BP (!) 151/96  RR 16  O2 94 %      General: Awake, alert, in no acute distress  Eyes: Gaze conjugate.  No scleral icterus or injection.  Swelling over the left eye.  Conjunctivo within normal limits.  20/25 visual acuity in bilateral eyes.  HENT: Normo-cephalic, atraumatic. No stridor  CV: Regular rate, regular rhythm. Radial pulses 2+ bilaterally  Resp: Breathing non-labored, speaking in full sentences.  Clear to auscultation bilaterally  GI: Soft, non-distended, non-tender. No rebound or guarding.  MSK/Extremities: No gross bony deformities. Moving all extremities  Skin: Warm. Appropriate color.  Vesicular rash surrounding left forehead.  Pinto sign.  Neuro: Alert. Oriented. Face symmetric. Speech is fluent.  Gross strength and sensation intact in b/l UE and LEs  Psych: Appropriate mood and affect      Medical Decision Making & ED Course   Medical Decision Makin y.o. female who is presenting as a transfer for concern for herpes Gia ophthalmicus.  Patient's visual acuity is still intact.  Discussed the patient's care with ophthalmology who were consulted and evaluated the patient.  They recommended valacyclovir, artificial tears, and erythromycin.  They arrange follow-up with their the patient as an outpatient.  Patient was discharged home in stable  condition.  ----      Differential diagnoses considered include but are not limited to: Herpes zoster opthalmicus     Social Determinants of Health which Significantly Impact Care: None identified     EKG Independent Interpretation: EKG interpreted by myself. Please see ED Course and [facility] for full interpretation.    Independent Result Review and Interpretation: Results were independently reviewed and interpreted by myself. Please see ED course and [facility] for full interpretation.    Chronic conditions affecting the patient's care: As documented in the MDM    The patient was discussed with the following consultants/services:  Seen and evaluated with ophthalmology regarding concern for herpes zoster    Care Considerations: As per [facility]    ED Course:  Diagnoses as of 08/25/24 2130   Herpes zoster ophthalmicus     Disposition   As a result of the work-up, the patient was discharged home.  she was informed of her diagnosis and instructed to come back with any concerns or worsening of condition.  she and was agreeable to the plan as discussed above.  she was given the opportunity to ask questions.  All of the patient's questions were answered.    Procedures   Procedures    Patient seen and discussed with ED attending physician.    Carmelita Field MD  Emergency Medicine     Carmelita Field MD  Resident  08/26/24 0846

## 2024-09-05 ENCOUNTER — TELEPHONE (OUTPATIENT)
Dept: PRIMARY CARE | Facility: CLINIC | Age: 59
End: 2024-09-05
Payer: MEDICAID

## 2024-09-05 DIAGNOSIS — G89.29 CHRONIC RIGHT SHOULDER PAIN: ICD-10-CM

## 2024-09-05 DIAGNOSIS — M25.511 CHRONIC RIGHT SHOULDER PAIN: ICD-10-CM

## 2024-09-05 RX ORDER — OXYCODONE AND ACETAMINOPHEN 10; 325 MG/1; MG/1
1 TABLET ORAL EVERY 6 HOURS PRN
Qty: 90 TABLET | Refills: 0 | Status: SHIPPED | OUTPATIENT
Start: 2024-09-05

## 2024-09-05 NOTE — TELEPHONE ENCOUNTER
Rx Refill Request Telephone Encounter     Name:  Angelia Jimenez  :  [id number]  Medication Name:       oxyCODONE-acetaminophen (Percocet)  mg tablet     Route: Take 1 tablet by mouth every 6 hours if needed for severe pain (7 - 10).     Specific Pharmacy location:  [facility] [id number] [facility] [address] [facility]        Date of last appointment:  24  Date of next appointment: 24  Best number to reach patient: [phone]

## 2024-09-13 ENCOUNTER — APPOINTMENT (OUTPATIENT)
Dept: PRIMARY CARE | Facility: CLINIC | Age: 59
End: 2024-09-13
Payer: MEDICARE

## 2024-09-17 ENCOUNTER — APPOINTMENT (OUTPATIENT)
Dept: UROLOGY | Facility: CLINIC | Age: 59
End: 2024-09-17
Payer: MEDICARE

## 2024-10-21 ENCOUNTER — APPOINTMENT (OUTPATIENT)
Dept: UROLOGY | Facility: CLINIC | Age: 59
End: 2024-10-21
Payer: MEDICAID

## 2024-10-29 LAB
CREAT UR-MCNC: 54.3 MG/DL (ref 20–320)
MICROALBUMIN UR-MCNC: <7 MG/L
MICROALBUMIN/CREAT UR: NORMAL MG/G{CREAT}

## 2024-11-01 ENCOUNTER — APPOINTMENT (OUTPATIENT)
Dept: PRIMARY CARE | Facility: CLINIC | Age: 59
End: 2024-11-01
Payer: MEDICAID

## 2024-11-01 VITALS
OXYGEN SATURATION: 97 % | DIASTOLIC BLOOD PRESSURE: 82 MMHG | WEIGHT: 173.4 LBS | BODY MASS INDEX: 30.72 KG/M2 | SYSTOLIC BLOOD PRESSURE: 122 MMHG | TEMPERATURE: 98 F | HEART RATE: 90 BPM | RESPIRATION RATE: 16 BRPM

## 2024-11-01 DIAGNOSIS — F17.200 CURRENT SMOKER: ICD-10-CM

## 2024-11-01 DIAGNOSIS — N18.2 CKD (CHRONIC KIDNEY DISEASE) STAGE 2, GFR 60-89 ML/MIN: ICD-10-CM

## 2024-11-01 DIAGNOSIS — R94.4 DECREASED GFR: ICD-10-CM

## 2024-11-01 DIAGNOSIS — E55.9 VITAMIN D INSUFFICIENCY: ICD-10-CM

## 2024-11-01 DIAGNOSIS — M25.511 CHRONIC RIGHT SHOULDER PAIN: ICD-10-CM

## 2024-11-01 DIAGNOSIS — G89.29 CHRONIC RIGHT SHOULDER PAIN: ICD-10-CM

## 2024-11-01 DIAGNOSIS — I10 PRIMARY HYPERTENSION: Primary | ICD-10-CM

## 2024-11-01 DIAGNOSIS — F11.90 CHRONIC NARCOTIC USE: ICD-10-CM

## 2024-11-01 DIAGNOSIS — Z23 NEEDS FLU SHOT: ICD-10-CM

## 2024-11-01 DIAGNOSIS — E78.2 HYPERLIPEMIA, MIXED: ICD-10-CM

## 2024-11-01 DIAGNOSIS — B02.29 HZV (HERPES ZOSTER VIRUS) POST HERPETIC NEURALGIA: ICD-10-CM

## 2024-11-01 DIAGNOSIS — E53.8 VITAMIN B12 DEFICIENCY: ICD-10-CM

## 2024-11-01 DIAGNOSIS — G43.009 MIGRAINE WITHOUT AURA AND WITHOUT STATUS MIGRAINOSUS, NOT INTRACTABLE: ICD-10-CM

## 2024-11-01 PROCEDURE — 3074F SYST BP LT 130 MM HG: CPT | Performed by: FAMILY MEDICINE

## 2024-11-01 PROCEDURE — 90679 RSV VACC PREF RECOMB ADJT IM: CPT | Performed by: FAMILY MEDICINE

## 2024-11-01 PROCEDURE — 90471 IMMUNIZATION ADMIN: CPT | Performed by: FAMILY MEDICINE

## 2024-11-01 PROCEDURE — 3079F DIAST BP 80-89 MM HG: CPT | Performed by: FAMILY MEDICINE

## 2024-11-01 PROCEDURE — 90472 IMMUNIZATION ADMIN EACH ADD: CPT | Performed by: FAMILY MEDICINE

## 2024-11-01 PROCEDURE — 90715 TDAP VACCINE 7 YRS/> IM: CPT | Performed by: FAMILY MEDICINE

## 2024-11-01 PROCEDURE — G2211 COMPLEX E/M VISIT ADD ON: HCPCS | Performed by: FAMILY MEDICINE

## 2024-11-01 PROCEDURE — 99214 OFFICE O/P EST MOD 30 MIN: CPT | Performed by: FAMILY MEDICINE

## 2024-11-01 PROCEDURE — 90656 IIV3 VACC NO PRSV 0.5 ML IM: CPT | Performed by: FAMILY MEDICINE

## 2024-11-01 RX ORDER — GABAPENTIN 300 MG/1
300 CAPSULE ORAL EVERY 6 HOURS
Qty: 120 CAPSULE | Refills: 1 | Status: SHIPPED | OUTPATIENT
Start: 2024-11-01 | End: 2024-11-01 | Stop reason: SDUPTHER

## 2024-11-01 RX ORDER — GABAPENTIN 300 MG/1
300 CAPSULE ORAL EVERY 6 HOURS
Qty: 120 CAPSULE | Refills: 1 | Status: SHIPPED | OUTPATIENT
Start: 2024-11-01 | End: 2024-12-31

## 2024-11-01 RX ORDER — NALOXONE HYDROCHLORIDE 4 MG/.1ML
1 SPRAY NASAL AS NEEDED
Qty: 2 EACH | Refills: 0 | Status: SHIPPED | OUTPATIENT
Start: 2024-11-01

## 2024-11-01 RX ORDER — LOSARTAN POTASSIUM 50 MG/1
75 TABLET ORAL DAILY
Qty: 135 TABLET | Refills: 3 | Status: SHIPPED | OUTPATIENT
Start: 2024-11-01 | End: 2025-10-27

## 2024-11-01 RX ORDER — OXYCODONE AND ACETAMINOPHEN 10; 325 MG/1; MG/1
1 TABLET ORAL EVERY 6 HOURS PRN
Qty: 90 TABLET | Refills: 0 | Status: SHIPPED | OUTPATIENT
Start: 2024-11-01